# Patient Record
Sex: MALE | Race: BLACK OR AFRICAN AMERICAN | URBAN - METROPOLITAN AREA
[De-identification: names, ages, dates, MRNs, and addresses within clinical notes are randomized per-mention and may not be internally consistent; named-entity substitution may affect disease eponyms.]

---

## 2018-02-01 PROBLEM — I25.10 CAD (CORONARY ARTERY DISEASE): Status: ACTIVE | Noted: 2018-02-01

## 2018-02-01 PROBLEM — N18.30 STAGE 3 CHRONIC KIDNEY DISEASE (HCC): Status: ACTIVE | Noted: 2018-02-01

## 2018-02-01 PROBLEM — I10 HTN (HYPERTENSION): Status: ACTIVE | Noted: 2018-02-01

## 2018-02-01 PROBLEM — R55 SYNCOPE AND COLLAPSE: Status: ACTIVE | Noted: 2018-02-01

## 2022-06-01 ENCOUNTER — APPOINTMENT (OUTPATIENT)
Dept: GENERAL RADIOLOGY | Age: 82
DRG: 177 | End: 2022-06-01
Payer: MEDICARE

## 2022-06-01 ENCOUNTER — APPOINTMENT (OUTPATIENT)
Dept: CT IMAGING | Age: 82
DRG: 177 | End: 2022-06-01
Payer: MEDICARE

## 2022-06-01 ENCOUNTER — HOSPITAL ENCOUNTER (INPATIENT)
Age: 82
LOS: 7 days | Discharge: HOSPICE | DRG: 177 | End: 2022-06-08
Attending: EMERGENCY MEDICINE | Admitting: INTERNAL MEDICINE
Payer: MEDICARE

## 2022-06-01 DIAGNOSIS — J18.9 PNEUMONIA OF LOWER LOBE DUE TO INFECTIOUS ORGANISM, UNSPECIFIED LATERALITY: ICD-10-CM

## 2022-06-01 DIAGNOSIS — R41.82 ALTERED MENTAL STATUS, UNSPECIFIED ALTERED MENTAL STATUS TYPE: Primary | ICD-10-CM

## 2022-06-01 PROBLEM — J15.9 HOSPITAL-ACQUIRED BACTERIAL PNEUMONIA: Status: ACTIVE | Noted: 2022-06-01

## 2022-06-01 LAB
AMORPHOUS: ABNORMAL /HPF
ANION GAP SERPL CALCULATED.3IONS-SCNC: 7 MMOL/L (ref 3–16)
BACTERIA: ABNORMAL /HPF
BASE EXCESS VENOUS: 16.8 MMOL/L (ref -2–3)
BASE EXCESS VENOUS: 17 MMOL/L (ref -2–3)
BASOPHILS ABSOLUTE: 0 K/UL (ref 0–0.2)
BASOPHILS RELATIVE PERCENT: 1.2 %
BILIRUBIN URINE: ABNORMAL
BLOOD, URINE: ABNORMAL
BUN BLDV-MCNC: 20 MG/DL (ref 7–20)
CALCIUM SERPL-MCNC: 8.9 MG/DL (ref 8.3–10.6)
CARBOXYHEMOGLOBIN: 1.3 % (ref 0–1.5)
CARBOXYHEMOGLOBIN: 1.6 % (ref 0–1.5)
CHLORIDE BLD-SCNC: 102 MMOL/L (ref 99–110)
CLARITY: CLEAR
CO2: 35 MMOL/L (ref 21–32)
COLOR: YELLOW
CREAT SERPL-MCNC: 0.9 MG/DL (ref 0.8–1.3)
EKG ATRIAL RATE: 68 BPM
EKG DIAGNOSIS: NORMAL
EKG Q-T INTERVAL: 388 MS
EKG QRS DURATION: 70 MS
EKG QTC CALCULATION (BAZETT): 421 MS
EKG R AXIS: 82 DEGREES
EKG T AXIS: 84 DEGREES
EKG VENTRICULAR RATE: 71 BPM
EOSINOPHILS ABSOLUTE: 0 K/UL (ref 0–0.6)
EOSINOPHILS RELATIVE PERCENT: 0.3 %
GFR AFRICAN AMERICAN: >60
GFR NON-AFRICAN AMERICAN: >60
GLUCOSE BLD-MCNC: 123 MG/DL (ref 70–99)
GLUCOSE URINE: NEGATIVE MG/DL
HCO3 VENOUS: 43.8 MMOL/L (ref 24–28)
HCO3 VENOUS: 45.5 MMOL/L (ref 24–28)
HCT VFR BLD CALC: 32.9 % (ref 40.5–52.5)
HEMOGLOBIN, VEN, REDUCED: 29.2 %
HEMOGLOBIN, VEN, REDUCED: 63.2 %
HEMOGLOBIN: 10.6 G/DL (ref 13.5–17.5)
HYALINE CASTS: ABNORMAL /LPF (ref 0–2)
KETONES, URINE: NEGATIVE MG/DL
LACTIC ACID: 1.6 MMOL/L (ref 0.4–2)
LEUKOCYTE ESTERASE, URINE: NEGATIVE
LYMPHOCYTES ABSOLUTE: 0.6 K/UL (ref 1–5.1)
LYMPHOCYTES RELATIVE PERCENT: 17.7 %
MCH RBC QN AUTO: 33.7 PG (ref 26–34)
MCHC RBC AUTO-ENTMCNC: 32.2 G/DL (ref 31–36)
MCV RBC AUTO: 104.7 FL (ref 80–100)
METHEMOGLOBIN VENOUS: 0.2 % (ref 0–1.5)
METHEMOGLOBIN VENOUS: 0.4 % (ref 0–1.5)
MICROSCOPIC EXAMINATION: YES
MONOCYTES ABSOLUTE: 0.2 K/UL (ref 0–1.3)
MONOCYTES RELATIVE PERCENT: 6.4 %
MUCUS: ABNORMAL /LPF
NEUTROPHILS ABSOLUTE: 2.5 K/UL (ref 1.7–7.7)
NEUTROPHILS RELATIVE PERCENT: 74.4 %
NITRITE, URINE: NEGATIVE
O2 SAT, VEN: 36 %
O2 SAT, VEN: 70 %
PCO2, VEN: 68.2 MMHG (ref 41–51)
PCO2, VEN: 79.4 MMHG (ref 41–51)
PDW BLD-RTO: 16.6 % (ref 12.4–15.4)
PH UA: 6 (ref 5–8)
PH VENOUS: 7.37 (ref 7.35–7.45)
PH VENOUS: 7.42 (ref 7.35–7.45)
PLATELET # BLD: 163 K/UL (ref 135–450)
PMV BLD AUTO: 9.8 FL (ref 5–10.5)
PO2, VEN: 34.9 MMHG (ref 25–40)
PO2, VEN: <30 MMHG (ref 25–40)
POTASSIUM REFLEX MAGNESIUM: 5.7 MMOL/L (ref 3.5–5.1)
POTASSIUM SERPL-SCNC: 3.7 MMOL/L (ref 3.5–5.1)
PRO-BNP: 903 PG/ML (ref 0–449)
PROTEIN UA: 100 MG/DL
RBC # BLD: 3.14 M/UL (ref 4.2–5.9)
RBC UA: ABNORMAL /HPF (ref 0–4)
SODIUM BLD-SCNC: 144 MMOL/L (ref 136–145)
SPECIFIC GRAVITY UA: 1.02 (ref 1–1.03)
TCO2 CALC VENOUS: 46 MMOL/L
TCO2 CALC VENOUS: 48 MMOL/L
TROPONIN: 0.09 NG/ML
URINE TYPE: ABNORMAL
UROBILINOGEN, URINE: 2 E.U./DL
WBC # BLD: 3.4 K/UL (ref 4–11)
WBC UA: ABNORMAL /HPF (ref 0–5)

## 2022-06-01 PROCEDURE — 6360000002 HC RX W HCPCS: Performed by: EMERGENCY MEDICINE

## 2022-06-01 PROCEDURE — 83880 ASSAY OF NATRIURETIC PEPTIDE: CPT

## 2022-06-01 PROCEDURE — 82803 BLOOD GASES ANY COMBINATION: CPT

## 2022-06-01 PROCEDURE — 83605 ASSAY OF LACTIC ACID: CPT

## 2022-06-01 PROCEDURE — 84132 ASSAY OF SERUM POTASSIUM: CPT

## 2022-06-01 PROCEDURE — 70450 CT HEAD/BRAIN W/O DYE: CPT

## 2022-06-01 PROCEDURE — 93005 ELECTROCARDIOGRAM TRACING: CPT | Performed by: EMERGENCY MEDICINE

## 2022-06-01 PROCEDURE — 1200000000 HC SEMI PRIVATE

## 2022-06-01 PROCEDURE — 2060000000 HC ICU INTERMEDIATE R&B

## 2022-06-01 PROCEDURE — 71045 X-RAY EXAM CHEST 1 VIEW: CPT

## 2022-06-01 PROCEDURE — 87040 BLOOD CULTURE FOR BACTERIA: CPT

## 2022-06-01 PROCEDURE — 36415 COLL VENOUS BLD VENIPUNCTURE: CPT

## 2022-06-01 PROCEDURE — 84484 ASSAY OF TROPONIN QUANT: CPT

## 2022-06-01 PROCEDURE — 96365 THER/PROPH/DIAG IV INF INIT: CPT

## 2022-06-01 PROCEDURE — 85025 COMPLETE CBC W/AUTO DIFF WBC: CPT

## 2022-06-01 PROCEDURE — 81001 URINALYSIS AUTO W/SCOPE: CPT

## 2022-06-01 PROCEDURE — 80048 BASIC METABOLIC PNL TOTAL CA: CPT

## 2022-06-01 PROCEDURE — 96375 TX/PRO/DX INJ NEW DRUG ADDON: CPT

## 2022-06-01 PROCEDURE — 99285 EMERGENCY DEPT VISIT HI MDM: CPT

## 2022-06-01 PROCEDURE — 2580000003 HC RX 258: Performed by: EMERGENCY MEDICINE

## 2022-06-01 RX ORDER — MECOBALAMIN 5000 MCG
10 TABLET,DISINTEGRATING ORAL NIGHTLY
Status: DISCONTINUED | OUTPATIENT
Start: 2022-06-02 | End: 2022-06-08 | Stop reason: HOSPADM

## 2022-06-01 RX ORDER — SODIUM CHLORIDE, SODIUM LACTATE, POTASSIUM CHLORIDE, CALCIUM CHLORIDE 600; 310; 30; 20 MG/100ML; MG/100ML; MG/100ML; MG/100ML
1000 INJECTION, SOLUTION INTRAVENOUS ONCE
Status: COMPLETED | OUTPATIENT
Start: 2022-06-01 | End: 2022-06-01

## 2022-06-01 RX ADMIN — CEFEPIME HYDROCHLORIDE 1000 MG: 1 INJECTION, POWDER, FOR SOLUTION INTRAMUSCULAR; INTRAVENOUS at 20:59

## 2022-06-01 RX ADMIN — SODIUM CHLORIDE, POTASSIUM CHLORIDE, SODIUM LACTATE AND CALCIUM CHLORIDE 1000 ML: 600; 310; 30; 20 INJECTION, SOLUTION INTRAVENOUS at 20:13

## 2022-06-01 RX ADMIN — VANCOMYCIN HYDROCHLORIDE 1750 MG: 10 INJECTION, POWDER, LYOPHILIZED, FOR SOLUTION INTRAVENOUS at 22:26

## 2022-06-01 ASSESSMENT — PAIN - FUNCTIONAL ASSESSMENT: PAIN_FUNCTIONAL_ASSESSMENT: NONE - DENIES PAIN

## 2022-06-01 ASSESSMENT — ENCOUNTER SYMPTOMS
SHORTNESS OF BREATH: 1
COUGH: 1
CHEST TIGHTNESS: 0

## 2022-06-01 NOTE — ED PROVIDER NOTES
[id number]          ATTENDING PHYSICIAN NOTE       Date of evaluation: 6/1/2022    Chief Complaint     Altered Mental Status (Pt sent to ED for abnormal labs and AMS- which is mostly more lethargy ) and Other      History of Present Illness     Bianka Weems is a 79 y.o. male who presents emergency department from nursing with altered mental status. Patient is a nonhistorian I cannot get any history from this patient. I reviewed the nursing home records at the same etiology complex past medical history on multiple medications. He does have a creatinine of 1.4 based on recent lab work performed today. Otherwise looked okay the renal profile. It appears he gets most of his care at [facility] where he had a creatinine on May 16 of 1.05. Looks like he had a UTI back in May 9. This is all from review of care everywhere    Review of Systems     Review of Systems   Cannot obtain secondary patient's mental status    Past Medical, Surgical, Family, and Social History     He has a past medical history of Hyperlipidemia and Hypertension. He has no past surgical history on file. His family history is not on file. He reports that he has never smoked. He has never used smokeless tobacco. He reports that he does not drink alcohol and does not use drugs.     Medications     Previous Medications    ASPIRIN 325 MG TABLET    Take 325 mg by mouth daily    CALCIUM CARBONATE (OSCAL) 500 MG TABS TABLET    Take 500 mg by mouth daily    HYDROCHLOROTHIAZIDE (HYDRODIURIL) 25 MG TABLET    Take 25 mg by mouth daily    LISINOPRIL (PRINIVIL;ZESTRIL) 40 MG TABLET    Take 40 mg by mouth daily    METOPROLOL TARTRATE (LOPRESSOR) 25 MG TABLET    Take 25 mg by mouth daily     MULTIVITAMIN-IRON-MINERALS-FOLIC ACID (CENTRUM) CHEWABLE TABLET    Take 1 tablet by mouth daily    OMEGA-3 FATTY ACIDS (FISH OIL) 1000 MG CAPS    Take 1,000 mg by mouth daily     SIMVASTATIN (ZOCOR) 20 MG TABLET    Take 20 mg by mouth nightly    TAMSULOSIN (FLOMAX) 0.4 MG CAPSULE    Take 0.4 mg by mouth daily       Allergies     He is allergic to penicillins. Physical Exam     INITIAL VITALS: BP: 125/79, Temp: 97.7 °F (36.5 °C), Heart Rate: 68, Resp: 18, SpO2: 95 %   Physical Exam  Vitals and nursing note reviewed. Constitutional:       Appearance: He is ill-appearing (Chronic). He is not diaphoretic. Eyes:      General: No scleral icterus. Conjunctiva/sclera: Conjunctivae normal.   Cardiovascular:      Rate and Rhythm: Normal rate. Rhythm irregular. Pulmonary:      Effort: Pulmonary effort is normal.      Breath sounds: Rhonchi present. Abdominal:      Palpations: Abdomen is soft. Musculoskeletal:      Cervical back: No rigidity. Right lower leg: Edema present. Left lower leg: Edema present. Skin:     General: Skin is warm and dry. Neurological:      Mental Status: He is disoriented. Comments: And oriented x0. Talking incomprehensibly. Diagnostic Results     EKG   Appears to be a sinus rhythm with PAC and compensatory pause. Electronically being read as atrial fibrillation but I do not believe this is atrial fibrillation. Rate approximately 70. No ST elevation or depression. RADIOLOGY:  CT HEAD WO CONTRAST   Final Result      Cerebral atrophy. No acute intracranial findings. XR CHEST PORTABLE   Final Result      Ill-defined airspace density in the left base, consistent with early infiltrate or atelectasis. No other acute cardiopulmonary findings.              LABS:   Results for orders placed or performed during the hospital encounter of 06/01/22   CBC with Auto Differential   Result Value Ref Range    WBC 3.4 (L) 4.0 - 11.0 K/uL    RBC 3.14 (L) 4.20 - 5.90 M/uL    Hemoglobin 10.6 (L) 13.5 - 17.5 g/dL    Hematocrit 32.9 (L) 40.5 - 52.5 %    .7 (H) 80.0 - 100.0 fL    MCH 33.7 26.0 - 34.0 pg    MCHC 32.2 31.0 - 36.0 g/dL    RDW 16.6 (H) 12.4 - 15.4 %    Platelets 163 135 - 450 K/uL    MPV 9.8 5.0 - 10.5 fL    Neutrophils % 74.4 %    Lymphocytes % 17.7 %    Monocytes % 6.4 %    Eosinophils % 0.3 %    Basophils % 1.2 %    Neutrophils Absolute 2.5 1.7 - 7.7 K/uL    Lymphocytes Absolute 0.6 (L) 1.0 - 5.1 K/uL    Monocytes Absolute 0.2 0.0 - 1.3 K/uL    Eosinophils Absolute 0.0 0.0 - 0.6 K/uL    Basophils Absolute 0.0 0.0 - 0.2 K/uL   Basic Metabolic Panel w/ Reflex to MG   Result Value Ref Range    Sodium 144 136 - 145 mmol/L    Potassium reflex Magnesium 5.7 (H) 3.5 - 5.1 mmol/L    Chloride 102 99 - 110 mmol/L    CO2 35 (H) 21 - 32 mmol/L    Anion Gap 7 3 - 16    Glucose 123 (H) 70 - 99 mg/dL    BUN 20 7 - 20 mg/dL    CREATININE 0.9 0.8 - 1.3 mg/dL    GFR Non-African American >60 >60    GFR African American >60 >60    Calcium 8.9 8.3 - 10.6 mg/dL   Troponin   Result Value Ref Range    Troponin 0.09 (H) <0.01 ng/mL   Brain Natriuretic Peptide   Result Value Ref Range    Pro- (H) 0 - 449 pg/mL   Urinalysis with Microscopic   Result Value Ref Range    Color, UA Yellow Straw/Yellow    Clarity, UA Clear Clear    Glucose, Ur Negative Negative mg/dL    Bilirubin Urine MODERATE (A) Negative    Ketones, Urine Negative Negative mg/dL    Specific Gravity, UA 1.025 1.005 - 1.030    Blood, Urine TRACE-INTACT (A) Negative    pH, UA 6.0 5.0 - 8.0    Protein,  (A) Negative mg/dL    Urobilinogen, Urine 2.0 (A) <2.0 E.U./dL    Nitrite, Urine Negative Negative    Leukocyte Esterase, Urine Negative Negative    Microscopic Examination YES     Urine Type NotGiven    Blood gas, venous (Lab)   Result Value Ref Range    pH, Trevor 7.366 7.350 - 7.450    pCO2, Trevor 79.4 (H) 41.0 - 51.0 mmHg    pO2, Trevor <30.0 25.0 - 40.0 mmHg    HCO3, Venous 45.5 (H) 24.0 - 28.0 mmol/L    Base Excess, Trevor 17.0 (H) -2.0 - 3.0 mmol/L    O2 Sat, Trevor 36 Not established %    Carboxyhemoglobin 1.3 0.0 - 1.5 %    MetHgb, Trevor 0.4 0.0 - 1.5 %    TC02 (Calc), Trevor 48 mmol/L    Hemoglobin, Trevor, Reduced 63.20 %   Lactic Acid Result Value Ref Range    Lactic Acid 1.6 0.4 - 2.0 mmol/L   EKG 12 Lead   Result Value Ref Range    Ventricular Rate 71 BPM    Atrial Rate 68 BPM    QRS Duration 70 ms    Q-T Interval 388 ms    QTc Calculation (Bazett) 421 ms    R Axis 82 degrees    T Axis 84 degrees    Diagnosis       EKG performed in ER and to be interpreted by ER physician. Confirmed by MD, ER ([id number]),  Rubenskim Funmilayo ([phone]) on 6/1/2022 7:27:17 PM         RECENT VITALS:  BP: 125/79,Temp: 97.7 °F (36.5 °C), Heart Rate: 68, Resp: 18, SpO2: 95 %     Procedures         ED Course     Nursing Notes, Past Medical Hx, Past Surgical Hx, Social Hx,Allergies, and Family Hx were reviewed. patient was given the following medications:  Orders Placed This Encounter   Medications    lactated ringers infusion 1,000 mL    cefepime (MAXIPIME) 1000 mg IVPB minibag     Order Specific Question:   Antimicrobial Indications     Answer:   Pneumonia (HAP)    vancomycin (VANCOCIN) 1,750 mg in dextrose 5 % 500 mL IVPB     Order Specific Question:   Antimicrobial Indications     Answer:   Pneumonia (CAP)       CONSULTS:  PHARMACY TO [address] [place], [place] [address]IST    MEDICAL DECISIONMAKING / ASSESSMENT / Zayrakar Jimenez is a 79 y.o. male presented with the above. Chest x-ray was suspicious for pneumonia started on cefepime and vancomycin after 2 blood cultures here. His renal profile is delayed by labs here however he had one earlier today with a creatinine 1.4 and a CO2 of 41. His venous blood gas pH 7.36 with a PCO2 74 so sounds like he would have a chronic CO2 retention given his elevated CO2 on his renal.  He was given fluids however he does have significant peripheral edema and the BMP is also delayed with a chest x-ray did not report significant CHF. His sats ranged from 91 to 97% he is placed on 2 L and remained 94-97. His BNP is came back at 907.   He did get fluids here in the ED because of the hypoxemia with pneumonia however this will need to be watched closely and his troponin was 0.09 which is then higher than baseline. His EKG showed the sinus with PACs. Family member in room let me know that he does have a history of pulmonary emboli however patient is anticoagulated on Eliquis and has not missed any doses while in the nursing facility. Critical Care:  Due to the immediate potential for life-threatening deterioration due to altered mental status and pneumonia, I spent 40 minutes providing critical care. This time excludes time spent performing procedures but includes time spent on direct patient care, history retrieval, review of the chart, and discussions with patient, family, and consultant(s). Clinical Impression     1. Altered mental status, unspecified altered mental status type    2. Pneumonia of lower lobe due to infectious organism, unspecified laterality        Disposition     PATIENT REFERRED TO:  No follow-up provider specified.     DISCHARGE MEDICATIONS:  New Prescriptions    No medications on file       Elly Reynolds MD  06/01/22 [id number]

## 2022-06-02 LAB
BASOPHILS ABSOLUTE: 0 K/UL (ref 0–0.2)
BASOPHILS RELATIVE PERCENT: 0.1 %
EOSINOPHILS ABSOLUTE: 0 K/UL (ref 0–0.6)
EOSINOPHILS RELATIVE PERCENT: 0.3 %
FOLATE: >20 NG/ML (ref 4.78–24.2)
HCT VFR BLD CALC: 33.2 % (ref 40.5–52.5)
HEMOGLOBIN: 10.5 G/DL (ref 13.5–17.5)
LYMPHOCYTES ABSOLUTE: 0.5 K/UL (ref 1–5.1)
LYMPHOCYTES RELATIVE PERCENT: 13.8 %
MCH RBC QN AUTO: 33 PG (ref 26–34)
MCHC RBC AUTO-ENTMCNC: 31.5 G/DL (ref 31–36)
MCV RBC AUTO: 104.8 FL (ref 80–100)
MONOCYTES ABSOLUTE: 0.2 K/UL (ref 0–1.3)
MONOCYTES RELATIVE PERCENT: 6 %
NEUTROPHILS ABSOLUTE: 2.6 K/UL (ref 1.7–7.7)
NEUTROPHILS RELATIVE PERCENT: 79.8 %
PDW BLD-RTO: 15.7 % (ref 12.4–15.4)
PLATELET # BLD: 131 K/UL (ref 135–450)
PMV BLD AUTO: 9.2 FL (ref 5–10.5)
PROCALCITONIN: 0.24 NG/ML (ref 0–0.15)
RBC # BLD: 3.17 M/UL (ref 4.2–5.9)
SEDIMENTATION RATE, ERYTHROCYTE: 31 MM/HR (ref 0–20)
TROPONIN: 0.08 NG/ML
TROPONIN: 0.1 NG/ML
VITAMIN B-12: >2000 PG/ML (ref 211–911)
WBC # BLD: 3.3 K/UL (ref 4–11)

## 2022-06-02 PROCEDURE — 1200000000 HC SEMI PRIVATE

## 2022-06-02 PROCEDURE — 97162 PT EVAL MOD COMPLEX 30 MIN: CPT

## 2022-06-02 PROCEDURE — 6360000002 HC RX W HCPCS

## 2022-06-02 PROCEDURE — 6360000002 HC RX W HCPCS: Performed by: INTERNAL MEDICINE

## 2022-06-02 PROCEDURE — 82607 VITAMIN B-12: CPT

## 2022-06-02 PROCEDURE — 82746 ASSAY OF FOLIC ACID SERUM: CPT

## 2022-06-02 PROCEDURE — 36415 COLL VENOUS BLD VENIPUNCTURE: CPT

## 2022-06-02 PROCEDURE — 2060000000 HC ICU INTERMEDIATE R&B

## 2022-06-02 PROCEDURE — 99221 1ST HOSP IP/OBS SF/LOW 40: CPT | Performed by: NURSE PRACTITIONER

## 2022-06-02 PROCEDURE — 85025 COMPLETE CBC W/AUTO DIFF WBC: CPT

## 2022-06-02 PROCEDURE — 97167 OT EVAL HIGH COMPLEX 60 MIN: CPT

## 2022-06-02 PROCEDURE — 97530 THERAPEUTIC ACTIVITIES: CPT

## 2022-06-02 PROCEDURE — 2580000003 HC RX 258

## 2022-06-02 PROCEDURE — 84145 PROCALCITONIN (PCT): CPT

## 2022-06-02 PROCEDURE — 6370000000 HC RX 637 (ALT 250 FOR IP)

## 2022-06-02 PROCEDURE — 92610 EVALUATE SWALLOWING FUNCTION: CPT

## 2022-06-02 PROCEDURE — 84484 ASSAY OF TROPONIN QUANT: CPT

## 2022-06-02 PROCEDURE — 85652 RBC SED RATE AUTOMATED: CPT

## 2022-06-02 RX ORDER — ACETAMINOPHEN 325 MG/1
650 TABLET ORAL EVERY 6 HOURS PRN
Status: DISCONTINUED | OUTPATIENT
Start: 2022-06-02 | End: 2022-06-08 | Stop reason: HOSPADM

## 2022-06-02 RX ORDER — SODIUM CHLORIDE 9 MG/ML
INJECTION, SOLUTION INTRAVENOUS CONTINUOUS
Status: ACTIVE | OUTPATIENT
Start: 2022-06-02 | End: 2022-06-03

## 2022-06-02 RX ORDER — ASPIRIN 325 MG
325 TABLET ORAL DAILY
Status: DISCONTINUED | OUTPATIENT
Start: 2022-06-02 | End: 2022-06-08 | Stop reason: HOSPADM

## 2022-06-02 RX ORDER — TAMSULOSIN HYDROCHLORIDE 0.4 MG/1
0.4 CAPSULE ORAL DAILY
Status: DISCONTINUED | OUTPATIENT
Start: 2022-06-02 | End: 2022-06-08 | Stop reason: HOSPADM

## 2022-06-02 RX ORDER — METOPROLOL SUCCINATE 25 MG/1
25 TABLET, EXTENDED RELEASE ORAL DAILY
Status: ON HOLD | COMMUNITY
End: 2022-06-08 | Stop reason: HOSPADM

## 2022-06-02 RX ORDER — SODIUM CHLORIDE 0.9 % (FLUSH) 0.9 %
5-40 SYRINGE (ML) INJECTION EVERY 12 HOURS SCHEDULED
Status: DISCONTINUED | OUTPATIENT
Start: 2022-06-02 | End: 2022-06-08 | Stop reason: HOSPADM

## 2022-06-02 RX ORDER — HYDRALAZINE HYDROCHLORIDE 20 MG/ML
5 INJECTION INTRAMUSCULAR; INTRAVENOUS EVERY 6 HOURS PRN
Status: DISCONTINUED | OUTPATIENT
Start: 2022-06-02 | End: 2022-06-08 | Stop reason: HOSPADM

## 2022-06-02 RX ORDER — ONDANSETRON 4 MG/1
4 TABLET, ORALLY DISINTEGRATING ORAL EVERY 8 HOURS PRN
Status: DISCONTINUED | OUTPATIENT
Start: 2022-06-02 | End: 2022-06-08 | Stop reason: HOSPADM

## 2022-06-02 RX ORDER — ENOXAPARIN SODIUM 100 MG/ML
40 INJECTION SUBCUTANEOUS DAILY
Status: DISCONTINUED | OUTPATIENT
Start: 2022-06-02 | End: 2022-06-02

## 2022-06-02 RX ORDER — OLANZAPINE 5 MG/1
5 TABLET, ORALLY DISINTEGRATING ORAL NIGHTLY
Status: DISCONTINUED | OUTPATIENT
Start: 2022-06-02 | End: 2022-06-02

## 2022-06-02 RX ORDER — ONDANSETRON 2 MG/ML
4 INJECTION INTRAMUSCULAR; INTRAVENOUS EVERY 6 HOURS PRN
Status: DISCONTINUED | OUTPATIENT
Start: 2022-06-02 | End: 2022-06-08 | Stop reason: HOSPADM

## 2022-06-02 RX ORDER — HYDROCHLOROTHIAZIDE 25 MG/1
25 TABLET ORAL DAILY
Status: DISCONTINUED | OUTPATIENT
Start: 2022-06-02 | End: 2022-06-02

## 2022-06-02 RX ORDER — THIAMINE HYDROCHLORIDE 100 MG/ML
100 INJECTION, SOLUTION INTRAMUSCULAR; INTRAVENOUS DAILY
Status: DISCONTINUED | OUTPATIENT
Start: 2022-06-02 | End: 2022-06-08 | Stop reason: HOSPADM

## 2022-06-02 RX ORDER — ENOXAPARIN SODIUM 100 MG/ML
1 INJECTION SUBCUTANEOUS 2 TIMES DAILY
Status: DISCONTINUED | OUTPATIENT
Start: 2022-06-02 | End: 2022-06-08 | Stop reason: HOSPADM

## 2022-06-02 RX ORDER — LISINOPRIL 40 MG/1
40 TABLET ORAL DAILY
Status: DISCONTINUED | OUTPATIENT
Start: 2022-06-02 | End: 2022-06-08 | Stop reason: HOSPADM

## 2022-06-02 RX ORDER — SODIUM CHLORIDE 0.9 % (FLUSH) 0.9 %
5-40 SYRINGE (ML) INJECTION PRN
Status: DISCONTINUED | OUTPATIENT
Start: 2022-06-02 | End: 2022-06-08 | Stop reason: HOSPADM

## 2022-06-02 RX ORDER — METOPROLOL SUCCINATE 25 MG/1
12.5 TABLET, EXTENDED RELEASE ORAL DAILY
Status: DISCONTINUED | OUTPATIENT
Start: 2022-06-02 | End: 2022-06-08 | Stop reason: HOSPADM

## 2022-06-02 RX ORDER — POLYETHYLENE GLYCOL 3350 17 G/17G
17 POWDER, FOR SOLUTION ORAL DAILY PRN
Status: DISCONTINUED | OUTPATIENT
Start: 2022-06-02 | End: 2022-06-08 | Stop reason: HOSPADM

## 2022-06-02 RX ORDER — ACETAMINOPHEN 650 MG/1
650 SUPPOSITORY RECTAL EVERY 6 HOURS PRN
Status: DISCONTINUED | OUTPATIENT
Start: 2022-06-02 | End: 2022-06-08 | Stop reason: HOSPADM

## 2022-06-02 RX ORDER — ATORVASTATIN CALCIUM 20 MG/1
10 TABLET, FILM COATED ORAL DAILY
Status: DISCONTINUED | OUTPATIENT
Start: 2022-06-02 | End: 2022-06-08 | Stop reason: HOSPADM

## 2022-06-02 RX ORDER — SODIUM CHLORIDE 9 MG/ML
INJECTION, SOLUTION INTRAVENOUS PRN
Status: DISCONTINUED | OUTPATIENT
Start: 2022-06-02 | End: 2022-06-08 | Stop reason: HOSPADM

## 2022-06-02 RX ADMIN — CEFEPIME HYDROCHLORIDE 2000 MG: 2 INJECTION, POWDER, FOR SOLUTION INTRAVENOUS at 16:09

## 2022-06-02 RX ADMIN — VANCOMYCIN HYDROCHLORIDE 750 MG: 10 INJECTION, POWDER, LYOPHILIZED, FOR SOLUTION INTRAVENOUS at 11:38

## 2022-06-02 RX ADMIN — CEFEPIME HYDROCHLORIDE 2000 MG: 2 INJECTION, POWDER, FOR SOLUTION INTRAVENOUS at 08:51

## 2022-06-02 RX ADMIN — VANCOMYCIN HYDROCHLORIDE 750 MG: 10 INJECTION, POWDER, LYOPHILIZED, FOR SOLUTION INTRAVENOUS at 23:27

## 2022-06-02 RX ADMIN — CEFEPIME HYDROCHLORIDE 2000 MG: 2 INJECTION, POWDER, FOR SOLUTION INTRAVENOUS at 23:03

## 2022-06-02 RX ADMIN — ENOXAPARIN SODIUM 90 MG: 100 INJECTION SUBCUTANEOUS at 22:55

## 2022-06-02 RX ADMIN — SODIUM CHLORIDE, PRESERVATIVE FREE 10 ML: 5 INJECTION INTRAVENOUS at 22:55

## 2022-06-02 RX ADMIN — THIAMINE HYDROCHLORIDE 100 MG: 100 INJECTION, SOLUTION INTRAMUSCULAR; INTRAVENOUS at 23:19

## 2022-06-02 RX ADMIN — SODIUM CHLORIDE, PRESERVATIVE FREE 10 ML: 5 INJECTION INTRAVENOUS at 09:00

## 2022-06-02 RX ADMIN — SODIUM CHLORIDE: 9 INJECTION, SOLUTION INTRAVENOUS at 08:47

## 2022-06-02 RX ADMIN — OLANZAPINE 5 MG: 5 TABLET, ORALLY DISINTEGRATING ORAL at 00:41

## 2022-06-02 ASSESSMENT — PAIN SCALES - PAIN ASSESSMENT IN ADVANCED DEMENTIA (PAINAD)
BODYLANGUAGE: 0
BREATHING: 1
TOTALSCORE: 1
FACIALEXPRESSION: 0
NEGVOCALIZATION: 0
CONSOLABILITY: 0

## 2022-06-02 ASSESSMENT — ENCOUNTER SYMPTOMS
SHORTNESS OF BREATH: 1
GASTROINTESTINAL NEGATIVE: 1
TROUBLE SWALLOWING: 1

## 2022-06-02 ASSESSMENT — PAIN SCALES - WONG BAKER: WONGBAKER_NUMERICALRESPONSE: 0

## 2022-06-02 NOTE — PROGRESS NOTES
Clinical Pharmacy Progress Note    Vancomycin  - Management by Pharmacy    Consult Date(s): 06/02/2022  Consulting Provider(s): Maurice    Assessment / Plan  Pneumonia (CAP) - Vancomycin   Concurrent Antimicrobials: Cefepime IV    Day of Vanc Therapy: 2   Current Dosing Method: Bayesian-Guided AUC Dosing   Therapeutic Goal: 400-600 mg/L*hr   Current Dose / Frequency: 750 mg every 12 hours   Plan / Rationale: Patient received 1750 mg x1 dose. A dose of 750 mg Q 12 hours will  expect AUC 465mg/L x hr with trough of 15.7 mg/L   Will continue to monitor clinical condition and make adjustments to regimen as appropriate. Thank you for consulting Pharmacy! Ludivina Sample, [facility], PharmD, [address] Vic[address]  6/2/2022 6:44 AM            Subjective/Objective: Mr. Bettye Salvador is a 79 y.o. male with a PMHx significant for  HLD, HTN , CAD s/p cardiac cath 2012 with stents, GERD, PVD, admitted for fatigue, SOB possible PNA. Pharmacy has been consulted to dose vancomycin. Ht Readings from Last 1 Encounters:   06/01/22 6' 5\" (1.956 m)     Wt Readings from Last 1 Encounters:   06/01/22 193 lb (87.5 kg)       Current & Prior Antimicrobial Regimen(s):   Cefepime     Level(s) / Doses:    Date Time Dose Level / Type of Level Interpretation                 Note: Serum levels collected for AUC-based dosing may be high if collected in close proximity to the dose administered. This is not necessarily indicative of toxicity. Cultures & Sensitivities:    Date Site Micro Susceptibility / Result                 Labs / Ancillary Data:    Estimated Creatinine Clearance: 77 mL/min (based on SCr of 0.9 mg/dL). Recent Labs     06/01/22  1946   CREATININE 0.9   BUN 20   WBC 3.4*       Additional Lab Values / Findings of Note:    Procalcitonin: No results for input(s): PROCAL in the last 72 hours.

## 2022-06-02 NOTE — PROGRESS NOTES
Cefepime 2 g Q 12 hours ordered for patient. This medication is renally eliminated. Will change to cefepime 2 g Q 8 hours per renal dose adjustment policy. Estimated Creatinine Clearance: 77 mL/min (based on SCr of 0.9 mg/dL). Pharmacy will continue to monitor renal function and adjust dose as necessary. Please call with any questions. Thanks!

## 2022-06-02 NOTE — CONSULTS
[facility]  Palliative Medicine Consultation Note      Date Of Admission:6/1/2022  Date of consult: 06/02/22  Seen by [facility] in the past:  No    Recommendations:        Pt is oriented to himself only and does not seem to have any insight into his medical problems. Called his (step)daughter Soledad Beck, introduced palliative care and had a voluntary discussion about advance care planning. Soledad Beck says she is pt's HCPOA and will bring the document in later this evening. She says pt has expressed multiple times in the past that he wants to be DNR. She reports a decline over the past year and is concerned about dysphagia. We agreed to talk again tomorrow after his MBS. 1. Goals of Care/Advanced Care planning/Code status: Full Code, discussed with Soledad Beck today. Will f/u with Soledad Beck tomorrow after she brings in [place] and after MBS. 2. Pain: pt denies pain. 3. SOB: denies sob and is breathing comfortably on 4 L oxygen. He's being treated for possible pneumonia vs possible aspiration event. 4. Dysphagia: daughter reports signs of dysphagia starting yesterday. He's NPO per SLP recommendations and plan is for [facility] tomorrow. Will f/u with family after the test. We briefly discussed PEG which Soledad Beck doesn't know that pt would want. 5. Unintentional weight loss: family reports 70-80# weight loss in the past 5 years, with significant weight loss in the past year.  Current BMI is 22.  6. Disposition: TBD    Reason for Consult:         [x]  Goals of Care  [x]  Code Status Discussion/Advanced Care Planning   []  Psychosocial/Family Support  []  Symptom Management  []  Other (Specify)    Requesting Physician: Dr. Boni Anuglo:  Fatigue, altered mental status, sob    History Obtained From:  family member - daughter Soledad Beck, electronic medical record    History of Present Illness:         Jeanine Colin is a 79 y.o. male with PMH of HLD, HTN , CAD s/p cardiac cath 2012 with stents, prostate cancer, GERD, KRISTIND who presented with fatigue, shortness of breath, and altered mental status. Patient was recently discharged for PE, UTI and MADDI with hypernatremia and was treated with antibiotics and discharged to SNF. He's now admitted with possible pneumonia with hx of COPD and recent PE. Subjective:         Past Medical History:        Diagnosis Date    Hyperlipidemia     Hypertension        Past Surgical History:    History reviewed. No pertinent surgical history. Current Medications:    Not in a hospital admission. Allergies:  Penicillins    Social History:    · TOBACCO: reports that he quit smoking about 4 weeks ago. His smoking use included cigarettes. He has a 70.00 pack-year smoking history. He has never used smokeless tobacco.  · ETOH:   reports no history of alcohol use. · Patient currently lives alone, most recently at a SNF    Review of Systems - obtained from pt and family  Review of Systems   Constitutional: Positive for fatigue and unexpected weight change. HENT: Positive for trouble swallowing. Respiratory: Positive for shortness of breath. Cardiovascular: Negative. Gastrointestinal: Negative. Genitourinary: Negative. Musculoskeletal: Negative. Neurological: Positive for weakness. Psychiatric/Behavioral: Positive for confusion.   :     Objective:        Physical Exam  Constitutional:       General: He is not in acute distress. HENT:      Head: Normocephalic and atraumatic. Cardiovascular:      Rate and Rhythm: Normal rate. Rhythm irregular. Pulmonary:      Effort: Pulmonary effort is normal.      Breath sounds: Normal breath sounds. Abdominal:      General: Bowel sounds are normal.      Palpations: Abdomen is soft. Musculoskeletal:      Right lower leg: Edema present. Left lower leg: Edema present. Comments: Dressings BLE   Neurological:      Mental Status: He is disoriented.       Comments: Oriented to self only          Palliative Performance Scale:  [] 60% Ambulation reduced; Significant disease; Can't do hobbies/housework; intake normal or reduced; occasional assist; LOC full/confusion  [] 50% Mainly sit/lie; Extensive disease; Can't do any work; Considerable assist; intake normal  Or reduced; LOC full/confusion  [x] 40% Mainly in bed; Extensive disease; Mainly assist; intake normal or reduced; occasional assist; LOC full/confusion  [] 30% Bed Bound; Extensive disease; Total care; intake reduced; LOC full/confusion  [] 20% Bed Bound; Extensive disease; Total care; intake minimal; Drowsy/coma  [] 10% Bed Bound; Extensive disease; Total care; Mouth care only; Drowsy/coma  [] 0% Death      Vitals:    /85   Pulse 79   Temp 97.6 °F (36.4 °C) (Oral)   Resp 22   Ht 6' 5\" (1.956 m)   Wt 193 lb (87.5 kg)   SpO2 92%   BMI 22.89 kg/m²     Labs:    BMP:   Recent Labs     06/01/22 1946 06/01/22  2256     --    K 5.7* 3.7     --    CO2 35*  --    BUN 20  --    CREATININE 0.9  --    GLUCOSE 123*  --      CBC:   Recent Labs     06/01/22 1946 06/02/22  0942   WBC 3.4* 3.3*   HGB 10.6* 10.5*   HCT 32.9* 33.2*    131*       LFT's: No results for input(s): AST, ALT, ALB, BILITOT, ALKPHOS in the last 72 hours. Troponin:   Recent Labs     06/01/22 1946 06/02/22  0942   TROPONINI 0.09* 0.10*     BNP: No results for input(s): BNP in the last 72 hours. ABGs: No results for input(s): PHART, TCY0LRT, PO2ART in the last 72 hours. INR: No results for input(s): INR in the last 72 hours. U/A:  Recent Labs     06/01/22 2058   COLORU Yellow   PHUR 6.0   WBCUA 3-5   RBCUA 0-2   MUCUS 1+*   BACTERIA 2+*   CLARITYU Clear   SPECGRAV 1.025   LEUKOCYTESUR Negative   UROBILINOGEN 2.0*   BILIRUBINUR MODERATE*   BLOODU TRACE-INTACT*   GLUCOSEU Negative   AMORPHOUS 1+       CT HEAD WO CONTRAST   Final Result      Cerebral atrophy. No acute intracranial findings.             XR CHEST PORTABLE   Final Result      Ill-defined airspace density in the left base,

## 2022-06-02 NOTE — CONSULTS
Clinical Pharmacy Consult Note       Pharmacy consulted by Dr. Haris Rivera in ED to order first dose of vancomycin. Indication: CAP with MDR risk factors    Patient Data:     No results for input(s): NA, K, CL, CO2, PHOS, BUN, CREATININE, CA in the last 72 hours. CrCl cannot be calculated (Patient's most recent lab result is older than the maximum 30 days allowed. ). Recent Labs     06/01/22  1946   WBC 3.4*   HGB 10.6*   HCT 32.9*   .7*        Height:  6' 5\" (195.6 cm)  Weight: 193 lb (87.5 kg)    Plan: Will order vancomycin 1750 mg x 1 dose, to be administered in ED. Please note this consult covers ED vancomycin dose only. If admitting provider would like further vancomycin dose management by pharmacy, please place additional consult order. Thank you for the consult. Please call with any questions.   Marisabel HernandezD  Main Pharmacy: [id number]  6/1/2022 9:34 PM

## 2022-06-02 NOTE — PROGRESS NOTES
Occupational Therapy  Facility/Department: [facility]  Occupational Therapy Initial Assessment and Treatment      Name: Diana Stone  : 1940  MRN: [id number]  Date of Service: 2022    Discharge Recommendations:  Diana Stone scored a 9/24 on the AM-PAC ADL Inpatient form. Current research shows that an AM-PAC score of 17 or less is typically not associated with a discharge to the patient's home setting. Based on the patient's AM-PAC score and their current ADL deficits, it is recommended that the patient have 3-5 sessions per week of Occupational Therapy at d/c to increase the patient's independence. Please see assessment section for further patient specific details. If patient discharges prior to next session this note will serve as a discharge summary. Please see below for the latest assessment towards goals. OT Equipment Recommendations  Equipment Needed:  (defer recommendations to discharge facility)       Patient Diagnosis(es): The primary encounter diagnosis was Altered mental status, unspecified altered mental status type. A diagnosis of Pneumonia of lower lobe due to infectious organism, unspecified laterality was also pertinent to this visit. Past Medical History:  has a past medical history of Hyperlipidemia and Hypertension. Past Surgical History:  has no past surgical history on file. Treatment Diagnosis: impaired ADLs/transfers and decreased functional activity tolerance      Assessment   Performance deficits / Impairments: Decreased functional mobility ; Decreased ADL status; Decreased cognition;Decreased endurance;Decreased strength;Decreased balance;Decreased posture  Assessment: Presenting from NH (per chart review). Pt is a poor historian and unable to provide information regarding facility or PLOF. Pt presenting w/ generalized weakness and requiring assist of 1/2 for bed mobility.  Sitting balance mostly Max A (w/ flexed posture and L lobo lean). Sit-stand w/ Mod/Max A of 2 - L knee buckling and unable to obtain full standing posture. Generalized fatigue on min exertion. Will continue to follow during admission as appropriate. Treatment Diagnosis: impaired ADLs/transfers and decreased functional activity tolerance  Prognosis: Fair;Poor  Decision Making: High Complexity  REQUIRES OT FOLLOW-UP: Yes  Activity Tolerance  Activity Tolerance: Patient limited by fatigue        Plan   Plan  Times per Week: 2-5  Times per Day: Daily  Current Treatment Recommendations: Strengthening,ROM,Balance training,Functional mobility training,Endurance training,Safety education & training,Cognitive reorientation,Self-Care / ADL     Restrictions  Position Activity Restriction  Other position/activity restrictions: up with assist    Subjective   General  Chart Reviewed: Yes  Additional Pertinent Hx: 79 y.o. M presents w/ AMS. Hospital Course: CT Head: (-). PMH: HLD, HTN , CAD s/p cardiac cath 2012 with stents, GERD, PVD  Family / Caregiver Present: No  Referring Practitioner: Levar Dewitt MD  Diagnosis: Hospital Acquired Bacterial Pneumonia    Subjective  Subjective: In bed on arrival. C/o feeling \"cold. \" Oriented to self, location; disoriented to situation. Soft spoken and difficult to understand. Social/Functional History  Social/Functional History  Type of Home: Facility (unknown if LTC or SNF?)  Home Equipment: Cane,Walker, 4 wheeled  Has the patient had two or more falls in the past year or any fall with injury in the past year?: Yes  Receives Help From:  (reports dtr visits)  ADL Assistance: [address]: Independent (reports he does his own laundry and meals)  Ambulation Assistance: Independent (using 4 wh walker or cane)  Transfer Assistance: Independent  Additional Comments: pt is poor historian. pt reports he is from home and IND prior to admission doing all household tasks and ambulating with 4WW or cane.  although per chart review pt from nursing home (unknown length of stay). unknown if pt is currently ambulatory or extent of what the facility is currently assisting pt with       Objective       O2 Device: Nasal cannula - 4L     Vision Exceptions: Wears glasses at all times  Hearing: Exceptions to [facility]  Hearing Exceptions: Hard of hearing/hearing concerns         Observation/Palpation  Observation: drooling     Safety Devices  Type of Devices: Nurse notified;Sitter present; Left in bed; All brendan prominences offloaded;Bed alarm in place;Call light within reach     Balance  Sitting: Impaired (x 7-8 minutes - mostly Max A w/ LOB to L and post; intermittent Min A; flexed trunk; unable to correct sitting balance despite physical/verbal/visual cues)  Standing: Impaired (Max A of 2 (unable to release L hand from bedrail to walker, L knee flexed))           ADL  Feeding: NPO  Feeding Skilled Clinical Factors: NOTE: observed to have drooling from mouth  Grooming: Moderate assistance (wiping drool from mouth)  LE Dressing: Dependent/Total (don socks)  Toileting: Dependent/Total        Activity Tolerance  Activity Tolerance: Patient tolerated evaluation without incident;Patient limited by endurance; Patient limited by fatigue  Activity Tolerance Comments: pt with very limited tolerance, fatiguing with EOB sitting and only able to maintain stance for ~10 seconds before needing to sit and return to supine     Bed mobility  Rolling to Left: Maximum assistance  Rolling to Right: Maximum assistance  Supine to Sit: Moderate assistance (HOB elevated, max VC for sequencing)  Sit to Supine: Maximum assistance;2 Person assistance (max A of 2)  Scooting: Maximal assistance     Transfers  Sit to stand: Dependent/Total (Mod/Max A of 2 (from bed to walker, knees blocked))  Stand to sit: Dependent/Total (Max A of 2)        Cognition  Overall Cognitive Status: Exceptions  Arousal/Alertness: Delayed responses to stimuli  Following Commands:  Follows one step commands with repetition  Attention Span: Difficulty attending to directions  Memory: Decreased recall of biographical Information;Decreased recall of recent events;Decreased short term memory  Safety Judgement: Decreased awareness of need for assistance;Decreased awareness of need for safety  Problem Solving: Decreased awareness of errors  Insights: Decreased awareness of deficits  Initiation: Requires cues for all                     Education Given To: Patient  Education Provided: Role of Therapy;Plan of Care  Education Method: Verbal  Barriers to Learning: Cognition  Education Outcome: Continued education needed; Unable to demonstrate understanding              Hand Dominance  Hand Dominance: Right         Pt seen by OT for eval and treat.  Treatment included: bed mobility, unsupported sitting, ADL, transfer                                                          AM-PAC Score        AM-[facility] Inpatient Daily Activity Raw Score: 9 (06/02/22 1002)  AM-PAC Inpatient ADL T-Scale Score : 25.33 (06/02/22 1002)  ADL Inpatient CMS 0-100% Score: 79.59 (06/02/22 1002)  ADL Inpatient CMS G-Code Modifier : CL (06/02/22 1002)    Goals  Short Term Goals  Time Frame for Short term goals: Discharge  Short Term Goal 1: unsupported sitting at EOB w/ Min A for sitting balance  Short Term Goal 2: simple grooming tasks w/ setup, Min A  Short Term Goal 3: improve sit-stand transfer to Min A of 2  Patient Goals   Patient goals : no goal stated       Therapy Time   Individual Concurrent Group Co-treatment   Time In 0900         Time Out 0933         Minutes 33           Timed Code Treatment Minutes:   17    Total Treatment Minutes:  [address] [facility] OTR/L #[id number]

## 2022-06-02 NOTE — PROGRESS NOTES
4 Eyes Admission Assessment     I agree as the admission nurse that 2 RN's have performed a thorough Head to Toe Skin Assessment on the patient. ALL assessment sites listed below have been assessed on admission. Areas assessed by both nurses:   [x]   Head, Face, and Ears   [x]   Shoulders, Back, and Chest  [x]   Arms, Elbows, and Hands   [x]   Coccyx, Sacrum, and Ischium  [x]   Legs, Feet, and Heels        Does the Patient have Skin Breakdown? Yes a wound was noted on the Admission Assessment and an LDA was Initiated documentation include the Promise-wound, Wound Assessment, Measurements, Dressing Treatment, Drainage, and Color\",         Hi Prevention initiated:  Yes  Wound Care Orders initiated: Yes  [address] nurse consulted for Pressure Injury (Stage 3,4, Unstageable, DTI, NWPT, and Complex wounds) or Hi score 18 or lower:  Yes    Wounds on buttocks and bilateral lower  Extremities. Podiatry already consulted for foot wounds, see notes.      Nurse 1 eSignature: Electronically signed by Donna Villasenor RN on 6/2/22 at 6:25 PM EDT    **SHARE this note so that the co-signing nurse is able to place an eSignature**    Nurse 2 eSignature: Electronically signed by Meli Gama RN on 6/2/22 at 7:07 PM EDT

## 2022-06-02 NOTE — PROGRESS NOTES
Physical Therapy  Facility/Department: [address] [place] [address]  Physical Therapy Initial Assessment    Name: Jackee Felty  : 1940  MRN: [id number]  Date of Service: 2022    Discharge Recommendations: Jackee Felty scored a 8/24 on the AM-PAC short mobility form. Current research shows that an AM-PAC score of 17 or less is typically not associated with a discharge to the patient's home setting. Based on the patient's AM-PAC score and their current functional mobility deficits, it is recommended that the patient have 3-5 sessions per week of Physical Therapy at d/c to increase the patient's independence. Please see assessment section for further patient specific details. If patient discharges prior to next session this note will serve as a discharge summary. Please see below for the latest assessment towards goals. PT Equipment Recommendations  Equipment Needed:  (defer)      Patient Diagnosis(es): The primary encounter diagnosis was Altered mental status, unspecified altered mental status type. A diagnosis of Pneumonia of lower lobe due to infectious organism, unspecified laterality was also pertinent to this visit. Past Medical History:  has a past medical history of Hyperlipidemia and Hypertension. Past Surgical History:  has no past surgical history on file. Assessment   Body Structures, Functions, Activity Limitations Requiring Skilled Therapeutic Intervention: Decreased functional mobility ; Decreased endurance;Decreased balance;Decreased cognition;Decreased strength;Decreased safe awareness  Assessment: pt is an 80 yo male from nursing home with AMS and unknown baseline due to pt being a poor historian. pt requiring assist of 2 for bed mobility and transfers at [facility]. pt demonstrating poor sitting and standing balance with L lateral lean, decreased trunk control, and increased L sided weakness. pt limited by decreased tolerance and cognition and is a high fall risk. Recommend pt seek further IP PT upon dc if below baseline or seek ECF if inappropriate for SNF. Will follow throughout stay  Treatment Diagnosis: dec functional mobility  Therapy Prognosis: Fair  Decision Making: Medium Complexity  Requires PT Follow-Up: Yes  Activity Tolerance  Activity Tolerance: Patient tolerated evaluation without incident;Patient limited by endurance; Patient limited by fatigue  Activity Tolerance Comments: pt with very limited tolerance, fatiguing with EOB sitting and only able to maintain stance for ~10 seconds before needing to sit and return to supine     Plan   Plan  Plan:  (2-5)  Current Treatment Recommendations: Strengthening,ROM,Balance training,Transfer training,Functional mobility training,Neuromuscular re-education,Safety education & training,Home exercise program,Patient/Caregiver education & training,Therapeutic activities,Gait training  Safety Devices  Type of Devices: Nurse notified,Sitter present,Left in bed,All brendan prominences offloaded,Bed alarm in place,Call light within reach     Restrictions  Position Activity Restriction  Other position/activity restrictions: up with assist     Subjective   General  Chart Reviewed:  Yes  Additional Pertinent Hx: pt is an 78 yo male presenting with AMS from nursing home  Referring Practitioner: Karen Harrell MD  Diagnosis: hospital aquired bacterial pneumonia  Follows Commands: Impaired (pt with confusion throughout session)  Subjective  Subjective: pt supine in bed and agreeable to PT. pt difficult to understand and is a poor historian despite being oriented x3         Social/Functional History  Social/Functional History  Type of Home: Facility (unknown if LTC or SNF?)  Home Equipment: Cane,Walker, 4 wheeled  Has the patient had two or more falls in the past year or any fall with injury in the past year?: Yes  Receives Help From:  (reports dtr visits)  ADL Assistance: Independent  Homemaking Assistance: Independent (reports he does his own laundry and meals)  Ambulation Assistance: Independent (using 4 wh walker or cane)  Transfer Assistance: Independent  Additional Comments: pt is poor historian. pt reports he is from home and IND prior to admission doing all household tasks and ambulating with 4WW or cane. although per chart review pt from nursing home (unknown length of stay).  unknown if pt is currently ambulatory or extent of what the facility is currently assisting pt with  Vision/Hearing  Vision  Vision: Impaired  Vision Exceptions: Wears glasses at all times  Hearing  Hearing: Exceptions to [facility]  Hearing Exceptions: Hard of hearing/hearing concerns    Cognition   Orientation  Overall Orientation Status: Impaired  Orientation Level: Oriented to place;Oriented to person;Oriented to time;Disoriented to situation  Cognition  Overall Cognitive Status: Exceptions     Objective   Heart Rate: 60  Heart Rate Source: Monitor  BP: (!) 150/76  BP Location: Left upper arm  Patient Position: Semi fowlers  MAP (Calculated): 100.67  Resp: 21  SpO2: 98 %  O2 Device: Nasal cannula                 Strength RLE  Comment: grossly weak, 3-/5  Strength LLE  Comment: grossly weak, 3-/5        Balance  Sitting: Impaired (x 7-8 minutes - mostly Max A w/ LOB to L and post; intermittent Min A; flexed trunk; unable to correct sitting balance despite physical/verbal/visual cues)  Standing: Impaired (Max A of 2 (unable to release L hand from bedrail to walker, L knee flexed))  Bed mobility  Rolling to Left: Maximum assistance  Rolling to Right: Maximum assistance  Supine to Sit: Moderate assistance (HOB elevated, max VC for sequencing)  Sit to Supine: Maximum assistance;2 Person assistance (max A of 2)  Scooting: Maximal assistance  Transfers  Sit to Stand: 2 Person Assistance;Dependent/Total (mod-max A of 2 at )  Stand to sit: Dependent/Total;2 Person Assistance  Comment: unable to maintain stance longer than ~10 sec or attempt at taking steps  Ambulation  Comments: unable to attempt ambulation on this day 2/2 to weakness/ fatigue     Balance  Sitting - Static: Poor;+ (sitting balance EOB maintained for 7-8 min with brief periods of min A but mostly required max A with L lateral lean and flexed posture)  Sitting - Dynamic: Poor  Standing - Static: Poor (mod- max A of 2 at RW with L knee buckling, L lateral lean and poor standing tolerance. pt needing to sit ~10 seconds into standing.  pt with flexed posture and inability to come to full upright posture)  Standing - Dynamic: Poor           OutComes Score                                                  AM-PAC Score  AM-PAC Inpatient Mobility Raw Score : 8 (06/02/22 1009)  AM-PAC Inpatient T-Scale Score : 28.52 (06/02/22 1009)  Mobility Inpatient CMS 0-100% Score: 86.62 (06/02/22 1009)  Mobility Inpatient CMS G-Code Modifier : CM (06/02/22 1009)          Goals  Short Term Goals  Time Frame for Short term goals: by dc  Short term goal 1: pt will perform bed mobility with min A  Short term goal 2: pt will perform sit <> stand transfers at [address] [place] [address] with mod A  Short term goal 3: pt will perform bed <> chair transfers with LRAD and mod A  Patient Goals   Patient goals : unable to state       Education  Patient Education  Education Given To: Patient  Education Provided: Plan of Care;Role of Therapy  Education Method: Demonstration;Verbal  Barriers to Learning: Cognition  Education Outcome: Continued education needed      Therapy Time   Individual Concurrent Group Co-treatment   Time In 0905         Time Out 0934         Minutes 29             Timed Code Treatment Minutes:  14 min      Total Treatment Minutes:  29 min       Chante Block, PT

## 2022-06-02 NOTE — CONSULTS
Clinical Pharmacy Progress Note  Medication History     Admit Date: 6/1/2022    Pharmacy has been consulted to review this patient's home medication list by Dr. Chucho Ray. List of current medications the patient is taking is in progress, and home medication list in Epic has been updated to reflect the changes noted below. Source(s) of information: discharge summary from [address] [place] [address] on 5/16/22. Patient from [facility] ([phone])-- tried 3 times to get med list faxed but no list ever sent, will f/u tomorrow. Patient was discharged on the following medications:  · Ipratropium-albuterol 0.5-2.5 (3) MG/3ML nebulizer solution  3 mLs by Nebulization route every 4 (four) hours as needed. · Tiotropium 1.25 MCG/ACT inhalation spray  Use 2 puffs in the morning. · Metoprolol succinate 25 mg extended-release tablet  Take 1 tablet by mouth in the morning. · Apixaban 5 mg starter pack  1 Tablet 2 times a day  · Fish oil 1000 MG Caps  1 capsule daily   · Lisinopril 40 MG Tabs  1 tablet daily   · multivitamin w/ minerals Tabs  · Simvastatin 20 MG Tabs  1 tablet daily   · Tamsulosin 0.4 MG Caps   1 tablet daily     Unable to tell if patient taking the following:  · Aspirin 325mg   · Calcium carbonate  · Hydrochlorothiazide    Thanks for consulting pharmacy!   Sam Juárez PharmD  Pharmacy Resident   Please call with questions [phone]  6/2/2022 3:05 PM

## 2022-06-02 NOTE — H&P
Internal Medicine  PGY 1  History & Physical      CC Fatigue with AMS     History Obtained From:  patient    HISTORY OF PRESENT ILLNESS:  Pt is a 78 yo M with a PMHx of HLD, HTN , CAD s/p cardiac cath 2012 with stents, GERD, PVD who presents with fatigue and SOB. Patient was recently discharged for PE, UTI and MADDI with hypernatremia and was treated with Ab and discharged to SNF. COPD is suspected as an underlying cause along with PE. The daughter was bed side who was saying the patient never fully recovered and was more altered today. Patient was brought to the hospital for further work up. Patient was a long time smoker who quit only  A few months prior to his admission at [facility] in May 2022. Denies any ETOH or illicit drug abuse. Pt was feeling more SOB but denied any pain, fevers, chills, nausea, vomiting, dysuria or hematuria. Pt has had poor PO intake the past few days. ED course  Patient was seen at bedside. Was AAOx3 but was difficult to understand. The patient daughter and the patient was spoken to about palliative care and possible hospice. She agreed she would like to speak to someone as she is a nurse. Full code for now. Pt vitals A feb, hr 72, rr 25, O2 95% on 2L. VBG 7.416/68/34. Labs appreciated which showed LA 1.6, K 5.7 which was repeated, Cr 0.9, Pro , Trop 0.09, WBC 3.4, Hg 10.6, , UA showed 3-5 wbc with +2 bacteria no LE. EKG unremarkable. CT head was negative for any acute events. CXR showed infiltration at the left lung base but in my views does not look different from prior cxr. Pt will be admitted for possible HAP PNA. Past Medical History:        Diagnosis Date    Hyperlipidemia     Hypertension    ·     Past Surgical History:    · History reviewed. No pertinent surgical history. Medications Priorto Admission:    · Not in a hospital admission. Allergies:  Penicillins    Social History:   · TOBACCO:   reports that he has never smoked.  He has never used smokeless tobacco.  · ETOH:   reports no history of alcohol use. · DRUGS : None  · Patient currently lives in a nursing home  ·   Family History:   · History reviewed. No pertinent family history. Review of Systems   Constitutional: Positive for activity change and appetite change. Negative for chills and fever. HENT: Negative. Respiratory: Positive for cough and shortness of breath. Negative for chest tightness. Cardiovascular: Negative for chest pain and leg swelling. Neurological: Positive for weakness. All other systems reviewed and are negative. ROS: A 10 point review of systems was conducted, significant findings as noted in HPI. Physical Exam  Vitals and nursing note reviewed. Constitutional:       Appearance: He is ill-appearing. HENT:      Head: Normocephalic. Nose: Nose normal.      Mouth/Throat:      Mouth: Mucous membranes are dry. Pharynx: Oropharynx is clear. Eyes:      Pupils: Pupils are equal, round, and reactive to light. Comments: Strabismus    Cardiovascular:      Rate and Rhythm: Normal rate and regular rhythm. Pulses: Normal pulses. Heart sounds: Normal heart sounds. Pulmonary:      Effort: Pulmonary effort is normal.      Breath sounds: Normal breath sounds. Abdominal:      General: Abdomen is flat. Bowel sounds are normal. There is no distension. Palpations: Abdomen is soft. Tenderness: There is no abdominal tenderness. Musculoskeletal:         General: Normal range of motion. Comments: Bilateral LE swelling in the feet    Skin:     General: Skin is warm. Capillary Refill: Capillary refill takes less than 2 seconds. Neurological:      General: No focal deficit present. Mental Status: He is oriented to person, place, and time. Cranial Nerves: No cranial nerve deficit. Sensory: No sensory deficit. Motor: Weakness present.    Psychiatric:         Mood and Affect: Mood normal.       Physical -     -MRSA swab sent    - Blood cultures x2  -Consult to palliative care- patient daughter POA interested in hospice   -Dietary consulted- Patient has not been eating or drinking recently   -PT OT   -IV fluids 100/h NS   -Continue with Eliquis BID from PE in May 2022     NSTEMI Type 2   -Trop elevated - will trend. Likely demand ischemia from poor PO intake. Echo showed 60-65% with grade 1 diastolic.      Bilateral feet swelling  -Podiatry consulted     BPH  - Continue with flomax     HLD  -Continue with Lipitor 10mg     HTN   -Continue with Lopressor 12.5mg Daily ( was on 25mg but was changed on DC from [place])   -Continue lisinopril 40mg and Hydrochlorothiazide     CAD  -Was on ASA 324mg but now hes off since hes been on Eliquis     MCV  -Folate and B12 sent       Will discuss with attending physician      Code Status:Full code  FEN: Adult Diet   PPX: Lovenox   DISPO: AUGUSTINE Fletcher MD  6/1/2022,  10:55 PM

## 2022-06-02 NOTE — ED NOTES
Speckled top, White/Yellow and Grey top urine tubes collected and sent to seng Croft RN  06/01/22 2142

## 2022-06-02 NOTE — PROGRESS NOTES
Speech Language Pathology  Facility/Department: [address] [place] [address]   CLINICAL BEDSIDE SWALLOW EVALUATION    NAME: Bob Guillen  : 1940  MRN: [id number]    ADMISSION DATE: 2022  ADMITTING DIAGNOSIS: has Syncope and collapse; HTN (hypertension); Stage 3 chronic kidney disease ([facility] [address] CAD (coronary artery disease); and Hospital-acquired bacterial pneumonia on their problem list.  ONSET DATE: 2022    Recent Chest Xray: (2022)  mpression       Ill-defined airspace density in the left base, consistent with early infiltrate or atelectasis.       No other acute cardiopulmonary findings. CT of Head: (2022)  Impression       Cerebral atrophy.       No acute intracranial findings. Date of Eval: 2022  Evaluating Therapist: BEL Barron    Current Diet level:  Current Diet : NPO      Primary Complaint  Patient Complaint: Patient did not state; referred d/t coughing with PO intake, concern for aspiration    Pain:  Pain Assessment  Pain Assessment: Gillis-Baker FACES  Gillis-Baker Pain Rating: No hurt    Reason for Referral  Bob Guillen was referred for a bedside swallow evaluation to assess the efficiency of his swallow function, identify signs and symptoms of aspiration and make recommendations regarding safe dietary consistencies, effective compensatory strategies, and safe eating environment. Impression  Dysphagia Diagnosis: Suspected needs further assessment  Dysphagia Impression : Suspect oropharyngeal dysphagia, needs further assessment. With patient seated up in bed on 3 L O2 via nc (awake and participatory, but lethargic and requiring cues), assessed tolerance ice chips, thins via tsp/straw, puree. Pt with positive oral acceptance, slowed oral prep with holding, positive but seemingly delayed laryngeal swallow movement, oral stasis, inconsistent wet reactive cough.  Recommend continue NPO, with frequent oral hygiene w/ suction kit, ice chips with nursing. Needs instrumental assessment, however given current lethargy and scheduling limitations, recommend defer till tomorrow. Will continue to follow. Dysphagia Outcome Severity Scale: Level 1: Severe dysphagia- NPO. Unable to tolerate any PO safely     Treatment Plan  Requires SLP Intervention: Yes  Duration of Treatment: 1-2 wks or LOS  D/C Recommendations: Ongoing speech therapy is recommended during this hospitalization       Recommended Diet and Intervention  NPO  Recommended Form of Meds: Via alternative means of nutrition  Recommendations: NPO;Dysphagia treatment  Therapeutic Interventions: Diet tolerance monitoring;Patient/Family education    Compensatory Swallowing Strategies  NPO    Treatment/Goals  Short-term Goals  Timeframe for Short-term Goals: 1-2 wks or LOS  Goal 1: Patient will participate in further assessment of swallow function as appropriate. Goal 2: Patient/caregiver will demonstrate understanding of swallowing concerns/recommendations. General  Chart Reviewed: Yes  Comments: Per admitting H&P (06/01/2022): 'Pt is a 78 yo M with a PMHx of HLD, HTN , CAD s/p cardiac cath 2012 with stents, GERD, PVD who presents with fatigue and SOB. Patient was recently discharged for PE, UTI and MADDI with hypernatremia and was treated with Ab and discharged to SNF. COPD is suspected as an underlying cause along with PE. The daughter was bed side who was saying the patient never fully recovered and was more altered today. Patient was brought to the hospital for further work up. Patient was a long time smoker who quit only  A few months prior to his admission at [facility] in May 2022. Denies any ETOH or illicit drug abuse. Pt was feeling more SOB but denied any pain, fevers, chills, nausea, vomiting, dysuria or hematuria. Pt has had poor PO intake the past few days. '  Subjective  Subjective: Received patient resting in bed, initially lethargic, blanket over head. Sitter present.  Able to rouse, repositioned upright in bed. Participated with cues, but remained lethargic throughout. Behavior/Cognition: Lethargic; Alert  Respiratory Status: O2 via nasual cannula  O2 Device: Nasal cannula  Liters of Oxygen: 3 L  Communication Observation: Functional  Follows Directions: Simple  Dentition:  (unable to fully assess)  Patient Positioning: Upright in bed  Baseline Vocal Quality: Wet  Volitional Cough: Strong; Wet  Prior Dysphagia History: None found in chart review. Vision/Hearing  Vision  Vision Exceptions: Wears glasses at all times  Hearing  Hearing: Exceptions to [facility]  Hearing Exceptions: Hard of hearing/hearing concerns    Oral Motor Deficits  Oral/Motor  Oral Hygiene: Moist    Oral Phase Dysfunction  Slowed oral prep, oral holding     Indicators of Pharyngeal Phase Dysfunction  Wet vocal quality, reactive cough    Prognosis  Individuals consulted  Consulted and agree with results and recommendations: RN;Patient  RN Name: Javid Bazan    Education  Patient Education: Educated pt to purpose of visit, swallow function, recommendations, strategies. Patient Education Response: Needs reinforcement  Safety Devices in place: Yes  Type of devices: All fall risk precautions in place; Bed alarm in place; Left in bed;Sitter present       Therapy Time  SLP Individual Minutes  Time In: 0655  Time Out: [address]  Minutes: 20     SLP Total Treatment Time  Timed Code Treatment Minutes: 0 Minutes  Total Treatment Time: 20     Plan  Diet Recommendations:     - NPO  - consider temporary alternative means nutrition/hydration  - oral hygiene 2-3x/day, ice chips w/ nursing  - tentatively plan for MBS tomorrow (pt too lethargic to participate at this time)    Discharge Plan:  TBD  Discussed with RN, April. Needs within reach. Electronically Signed by:  Naz Keenan M.A., Dwayne Pacheco   Speech-Language Pathologist  Pager #[phone]    This document will serve as a discharge summary if pt discharges before next treatment.

## 2022-06-02 NOTE — PROGRESS NOTES
Patient arrived to room 6326 from ED. Vitals stable. On 4 L of oxygen. Connected to heart monitor. Patient alert to self. Bed alarm on for patient safety. Call light in reach, bed locked and in lowest position.

## 2022-06-02 NOTE — PROGRESS NOTES
Cervical back: Normal range of motion and neck supple. Right lower leg: Edema present. Left lower leg: Edema present. Skin:     General: Skin is warm. Neurological:      Mental Status: He is alert. LABS:    CBC:   Recent Labs     06/01/22 1946   WBC 3.4*   HGB 10.6*   HCT 32.9*      .7*     Renal:    Recent Labs     06/01/22 1946 06/01/22 2256     --    K 5.7* 3.7     --    CO2 35*  --    BUN 20  --    CREATININE 0.9  --    GLUCOSE 123*  --    CALCIUM 8.9  --    ANIONGAP 7  --      Hepatic: No results for input(s): AST, ALT, BILITOT, BILIDIR, PROT, LABALBU, ALKPHOS in the last 72 hours. Troponin:   Recent Labs     06/01/22 1946   TROPONINI 0.09*     BNP: No results for input(s): BNP in the last 72 hours. Lipids: No results for input(s): CHOL, HDL in the last 72 hours. Invalid input(s): LDLCALCU, TRIGLYCERIDE  ABGs:  No results for input(s): PHART, GHS9QSF, PO2ART, FCP4KKQ, BEART, THGBART, R7WCSMZV, GOV1DGU in the last 72 hours. INR: No results for input(s): INR in the last 72 hours. Lactate: No results for input(s): LACTATE in the last 72 hours. Cultures:  -----------------------------------------------------------------  RAD:   CT HEAD WO CONTRAST   Final Result      Cerebral atrophy. No acute intracranial findings. XR CHEST PORTABLE   Final Result      Ill-defined airspace density in the left base, consistent with early infiltrate or atelectasis. No other acute cardiopulmonary findings.              Assessment/Plan:     Pt is a 80 yo M with a PMHx of HLD, HTN , CAD s/p cardiac cath 2012 with stents, GERD, PVD who presents with fatigue and SOB.     Possible Aspiration event VS PNA   Hx COPD   Hx PE   Patient O2 requirements have increased while at the SNF according to the daughter, however patient is at baseline O2 since being discharged at [facility] after a PE. CXR showed left sided infilatrates as read by radiologist but unclear to myself as it looks similar to previous CXR. Does not look infectious to me or an acute issue. Issues seem more chronic. No Leukocytosis or recorded fevers. VBG 7.41/68/30      -Sent Procal/ ESR/CRP out   -Respiratory cultures and viral panel  -Cefepime and Vanc ( 6/1 -     - d/c abx if procal is negative, PNA low suspicion   -MRSA swab sent    - Blood cultures x2  -Consult to palliative care- patient daughter POA interested in hospice   -Dietary consulted- Patient has not been eating or drinking recently   -PT OT   -IV fluids 100/h NS   -Continue with Eliquis BID from PE in May 2022   - consulted palliative care     NSTEMI Type 2   -Trop elevated - will trend. Likely demand ischemia from poor PO intake.  Echo showed 60-65% with grade 1 diastolic.      Bilateral feet swelling  -Podiatry consulted      BPH  - Continue with flomax      HLD  -Continue with Lipitor 10mg      HTN   -Continue with Lopressor 12.5mg Daily ( was on 25mg but was changed on DC from [employer])   -Continue lisinopril 40mg and Hydrochlorothiazide      CAD  -Was on ASA 324mg but now hes off since hes been on Eliquis      MCV  -Folate and B12 sent       Code Status: full code  FEN: npo   PPX: lovenox  DISPO:     Alyssa Shen MD, PGY-1  06/02/22  8:17 AM    This patient has been staffed and discussed with Darya Jeffrey MD.

## 2022-06-02 NOTE — CONSULTS
Department of Podiatry Consult Note  Resident       Reason for Consult: lower extremity in the feet swelling  Requesting Physician:  Beatriz Morales MD    CHIEF COMPLAINT: Hospital-acquired pneumonia    HISTORY OF PRESENT ILLNESS:      The patient is a 76-year-old male with significant past medical history as listed below. Patient was admitted for hospital-acquired pneumonia and podiatry was consulted for lower extremity in the feet swelling. Patient states that he was at the nursing home when they put the dressings to his feet. Patient states that they have been changed in about 4 days. Patient states that the dressings usually just cover the legs and leave the feet alone. Patient denies any pain coming from bilateral legs but just complains of the swelling. Patient denies any other pedal complaints. Patient denies any N/V/F/C/SoB. Past Medical History:        Diagnosis Date    Hyperlipidemia     Hypertension      Past Surgical History:    History reviewed. No pertinent surgical history.   Current Medications:    Current Facility-Administered Medications: vancomycin (VANCOCIN) 750 mg in dextrose 5 % 250 mL IVPB, 750 mg, IntraVENous, Q12H  cefepime (MAXIPIME) 2000 mg IVPB minibag in NS, 2,000 mg, IntraVENous, Q8H  sodium chloride flush 0.9 % injection 5-40 mL, 5-40 mL, IntraVENous, 2 times per day  sodium chloride flush 0.9 % injection 5-40 mL, 5-40 mL, IntraVENous, PRN  0.9 % sodium chloride infusion, , IntraVENous, PRN  ondansetron (ZOFRAN-ODT) disintegrating tablet 4 mg, 4 mg, Oral, Q8H PRN **OR** ondansetron (ZOFRAN) injection 4 mg, 4 mg, IntraVENous, Q6H PRN  polyethylene glycol (GLYCOLAX) packet 17 g, 17 g, Oral, Daily PRN  acetaminophen (TYLENOL) tablet 650 mg, 650 mg, Oral, Q6H PRN **OR** acetaminophen (TYLENOL) suppository 650 mg, 650 mg, Rectal, Q6H PRN  0.9 % sodium chloride infusion, , IntraVENous, Continuous  aspirin tablet 325 mg, 325 mg, Oral, Daily  metoprolol succinate (TOPROL XL) extended release tablet 12.5 mg, 12.5 mg, Oral, Daily  atorvastatin (LIPITOR) tablet 10 mg, 10 mg, Oral, Daily  tamsulosin (FLOMAX) capsule 0.4 mg, 0.4 mg, Oral, Daily  lisinopril (PRINIVIL;ZESTRIL) tablet 40 mg, 40 mg, Oral, Daily  apixaban (ELIQUIS) tablet 5 mg, 5 mg, Oral, BID  OLANZapine zydis (ZYPREXA) disintegrating tablet 5 mg, 5 mg, Oral, Nightly  melatonin disintegrating tablet 10 mg, 10 mg, Oral, Nightly  Allergies:   Penicillins  Social History:    TOBACCO:   reports that he has never smoked. He has never used smokeless tobacco.  Family History:   History reviewed. No pertinent family history. REVIEW OF SYSTEMS:      Review of Systems: Pertinent positive and negative findings as documented in the HPI, otherwise all other systems were reviewed and were negative. PHYSICAL EXAM:      Vitals:    BP (!) 154/71   Pulse 64   Temp 97.7 °F (36.5 °C) (Oral)   Resp 28   Ht 6' 5\" (1.956 m)   Wt 193 lb (87.5 kg)   SpO2 100%   BMI 22.89 kg/m²     LABS:   Recent Labs     06/01/22 1946   WBC 3.4*   HGB 10.6*   HCT 32.9*        Recent Labs     06/01/22 1946 06/01/22  2256     --    K 5.7* 3.7     --    CO2 35*  --    BUN 20  --    CREATININE 0.9  --      No results for input(s): PROT, INR, APTT in the last 72 hours. VASCULAR: DP pulses were nonpalpable due to pedal edema but PT pulses were palpable +2/4. DP pulses upon Doppler examination around the biphasic bilaterally. Saul Figueroa CFT is brisk to the digits of the foot b/l. Skin temperature is warm to cool from proximal to distal with no focal increase in temperature noted. +3 pitting edema noted to patient's feet bilaterally but no edema noted to patient's legs. No erythema noted. No pain with calf compression b/l. NEUROLOGIC: Gross and epicritic sensation is diminished b/l. Protective sensation is diminished at all pedal sites b/l. DERMATOLOGIC: Chronic dermatologic changes noted to patient's bilateral lower extremities.   Nails 1-5 b/l are within normal limits of length, thickness, and color. Webspaces 1-4 b/l are clean, dry, and intact. Hyperkeratosis noted to the dorsal aspect of the second digit bilaterally. No open wounds noted. No subcutaneous nodules, rashes, or other skin lesions noted. Patient provided verbal consent for pictures obtained today:            MUSCULOSKELETAL: No pain to palpation noted. Muscle strength 4/5 for all pedal muscle groups. Ankle joint ROM is decreased in dorsiflexion with the knee extended. Rigid hammertoes 2 through 5 bilateral.  IMPRESSION/RECOMMENDATIONS:      1. Edema 2/2 poor compression   2. Peripheral vascular disease    -Patient examined and evaluated at bedside   -Hypertensive but other VSS, no leukocytosis noted (WBC 3.4)  -Wound culture not taken as no open wounds noted. -Dressing applied to bilateral LE consisting of one 4 inch and one 6 inch Ace.  -No Abx necessary from podiatric perspective as no open wounds noted but patient can continue antibiotics for his hospital-acquired pneumonia  -Patient is to be weightbearing as tolerated      Dispo: Patient with pedal edema secondary to poor compression. Recommend patient's legs will be wrapped for continuous compression for 3 days. After 3 days patient can transition to RUBÉN hose bilaterally. Patient is remain weightbearing as tolerated to bilateral lower extremities. Podiatry will sign off as no open wounds noted. Please reconsult as needed if any changes arise in his lower extremities. - Will sign off on the patient. If any other pedal problems occur please contact us. Thank you for the opportunity to take part in the patient's care.      - The patient was seen and evaluated at bedside with Dr. Ten Antonio, [address] [place] [address]  Podiatric Resident, PGY-1  Pager #: [phone] or Perfect Serve

## 2022-06-02 NOTE — CARE COORDINATION
Case Management Assessment           Initial Evaluation                Date / Time of Evaluation: 6/2/2022 4:41 PM                 Assessment Completed by: VICTORINA Lemus, BELLOW    Patient Name: Efraín Casas     YOB: 1940  Diagnosis: Altered mental status, unspecified altered mental status type [R41.82]  Hospital-acquired bacterial pneumonia [J15.9]  Pneumonia of lower lobe due to infectious organism, unspecified laterality [J18.9]     Date / Time: 6/1/2022  6:49 PM    Patient Admission Status: Inpatient    If patient is discharged prior to next notation, then this note serves as note for discharge by case management. Current PCP: ADAN Madera (Inactive)  Clinic Patient: No    Chart Reviewed: Yes  Patient/ Family Interviewed: Yes    Initial assessment completed at bedside with:     Hospitalization in the last 30 days: No    Emergency Contacts:  Extended Emergency Contact Information  Primary Emergency Contact: Elissa Nava [address] [address] Phone: [phone]  Relation: Child  Secondary Emergency Contact: Blake Antunez  [place] Phone: [phone]  Relation: Child    Advance Directives:   Code Status: Full [address]: Yes  Agent: Sandy Cheung Number: [phone]    Financial  Payor: Gomez Matthew / Plan: Kingston Thomas / Product Type: *No Product type* /     Pre-cert required for SNF: Yes    Pharmacy  No Pharmacies Listed    Potential assistance Purchasing Medications: Potential Assistance Purchasing Medications: No  Does Patient want to participate in local refill/ meds to beds program?:      Meds To Beds General Rules:  1. Can ONLY be done Monday- Friday between 8:30am-5pm  2. Prescription(s) must be in pharmacy by 3pm to be filled same day  3. Copy of patient's insurance/ prescription drug card and patient face sheet must be sent along with the prescription(s)  4. Cost of Rx cannot be added to hospital bill.  If financial assistance is needed, please contact unit  or ;  or  CANNOT provide pharmacy voucher for patients co-pays  5. Patients can then  the prescription on their way out of the hospital at discharge, or pharmacy can deliver to the bedside if staff is available. (payment due at time of pick-up or delivery - cash, check, or card accepted)     Able to afford home medications/ co-pay costs: Yes    ADLS  Support Systems: Children,Family Members    PT AM-PAC: 8 /24  OT AM-PAC: 9 /24    [place]: [facility] - SNF    Steps: none    Plans to RETURN to current housing: Yes  Barriers to RETURNING to current housing: NA      Durable Medical Equipment  Equipment: cane and walker    Home Oxygen and Respiratory Equipment  Has HOME OXYGEN prior to admission: Yes    Dialysis  Active with HD/PD prior to admission: No      DISCHARGE PLAN:  Disposition:  LTC with hospice    Transportation PLAN for discharge:  EMS    Factors facilitating achievement of predicted outcomes: Family support and Caregiver support    Barriers to discharge: Medical complications    Additional Case Management Notes:  CM called daughter/POARafiq [phone]. Pt is from [address] [place] [address], but was scheduled to transfer to [address] [place] [address] [facility] tomorrow. Before SNF, pt had been living at home with family, still independent with some ADLs, but gradually detiriorating this past year. Pt has been refusing PT/OT at SNF, and not eating/drinking. Pt started on 2L O2, no on 4L. Rafiq Mai is agreeable to LTC with hospice. CM will coordinate with Mauri.            The Plan for Transition of Care is related to the following treatment goals of Altered mental status, unspecified altered mental status type [R41.82]  Hospital-acquired bacterial pneumonia [J15.9]  Pneumonia of lower lobe due to infectious organism, unspecified laterality [J18.9]    The Patient and/or patient representative Alen Guardado and his family were provided with a choice of provider and agrees with the discharge plan Yes    Freedom of choice list was provided with basic dialogue that supports the patient's individualized plan of care/goals and shares the quality data associated with the providers.  Yes    Care Transition patient: No    VICTORINA Frausto, [facility]  Case Management Department  Ph: [phone]   Fax: [phone]

## 2022-06-02 NOTE — PROGRESS NOTES
4 Eyes Admission Assessment     I agree as the admission nurse that 2 RN's have performed a thorough Head to Toe Skin Assessment on the patient. ALL assessment sites listed below have been assessed on admission. Areas assessed by both nurses:  [x]   Head, Face, and Ears   [x]   Shoulders, Back, and Chest  [x]   Arms, Elbows, and Hands   [x]   Coccyx, Sacrum, and Ischium  [x]   Legs, Feet, and Heels        Does the Patient have Skin Breakdown? Two stage 2's on coccyx present on admission: mepilex applied, turned side to side. Pt positions self back onto back (flat) after being turned. Education attempt made, pt mental status not conducive to learning at this time. Assisted turns continue.         Hi Prevention initiated:  Yes   Wound Care Orders initiated:  No      [facility] nurse consulted for Pressure Injury (Stage 3,4, Unstageable, DTI, NWPT, and Complex wounds) or Hi score 18 or lower:  No      Nurse 1 eSignature: Electronically signed by Dax Marvin RN on 6/2/22 at 11:58 AM EDT    Nurse 2 eSignature: Electronically signed by Aracelis Griffiths RN on 6/2/22 at 1:15 PM EDT

## 2022-06-02 NOTE — ED NOTES
Patient transferred to hospital bed for comfort. Bed alarm placed.      Laura Hastings RN  06/02/22 9002

## 2022-06-02 NOTE — PROGRESS NOTES
Bedside swallow eval attempted with no straw and 3oz water. Pt took small drink, within a few seconds pt began coughing and clearing throat, scant volume of water ran out of mouth during episode. Pt made NPO, physician notified, speech eval ordered. Pt's sp02 97%% after episode.

## 2022-06-03 ENCOUNTER — APPOINTMENT (OUTPATIENT)
Dept: GENERAL RADIOLOGY | Age: 82
DRG: 177 | End: 2022-06-03
Payer: MEDICARE

## 2022-06-03 ENCOUNTER — APPOINTMENT (OUTPATIENT)
Dept: CT IMAGING | Age: 82
DRG: 177 | End: 2022-06-03
Payer: MEDICARE

## 2022-06-03 PROBLEM — R62.7 FTT (FAILURE TO THRIVE) IN ADULT: Status: ACTIVE | Noted: 2022-06-03

## 2022-06-03 LAB
ANION GAP SERPL CALCULATED.3IONS-SCNC: 8 MMOL/L (ref 3–16)
BASOPHILS ABSOLUTE: 0 K/UL (ref 0–0.2)
BASOPHILS RELATIVE PERCENT: 0.2 %
BUN BLDV-MCNC: 17 MG/DL (ref 7–20)
CALCIUM SERPL-MCNC: 9.2 MG/DL (ref 8.3–10.6)
CHLORIDE BLD-SCNC: 103 MMOL/L (ref 99–110)
CO2: 36 MMOL/L (ref 21–32)
CREAT SERPL-MCNC: 0.9 MG/DL (ref 0.8–1.3)
EOSINOPHILS ABSOLUTE: 0 K/UL (ref 0–0.6)
EOSINOPHILS RELATIVE PERCENT: 0.3 %
GFR AFRICAN AMERICAN: >60
GFR NON-AFRICAN AMERICAN: >60
GLUCOSE BLD-MCNC: 66 MG/DL (ref 70–99)
HCT VFR BLD CALC: 34.7 % (ref 40.5–52.5)
HEMOGLOBIN: 11 G/DL (ref 13.5–17.5)
LYMPHOCYTES ABSOLUTE: 0.5 K/UL (ref 1–5.1)
LYMPHOCYTES RELATIVE PERCENT: 11.7 %
MAGNESIUM: 2 MG/DL (ref 1.8–2.4)
MCH RBC QN AUTO: 33.3 PG (ref 26–34)
MCHC RBC AUTO-ENTMCNC: 31.8 G/DL (ref 31–36)
MCV RBC AUTO: 104.6 FL (ref 80–100)
MONOCYTES ABSOLUTE: 0.3 K/UL (ref 0–1.3)
MONOCYTES RELATIVE PERCENT: 6.7 %
NEUTROPHILS ABSOLUTE: 3.3 K/UL (ref 1.7–7.7)
NEUTROPHILS RELATIVE PERCENT: 81.1 %
PDW BLD-RTO: 15.4 % (ref 12.4–15.4)
PLATELET # BLD: 123 K/UL (ref 135–450)
PMV BLD AUTO: 9 FL (ref 5–10.5)
POTASSIUM REFLEX MAGNESIUM: 4.2 MMOL/L (ref 3.5–5.1)
RBC # BLD: 3.32 M/UL (ref 4.2–5.9)
SODIUM BLD-SCNC: 147 MMOL/L (ref 136–145)
VANCOMYCIN TROUGH: 19 UG/ML (ref 10–20)
WBC # BLD: 4 K/UL (ref 4–11)

## 2022-06-03 PROCEDURE — 2060000000 HC ICU INTERMEDIATE R&B

## 2022-06-03 PROCEDURE — 80048 BASIC METABOLIC PNL TOTAL CA: CPT

## 2022-06-03 PROCEDURE — 74230 X-RAY XM SWLNG FUNCJ C+: CPT

## 2022-06-03 PROCEDURE — 80202 ASSAY OF VANCOMYCIN: CPT

## 2022-06-03 PROCEDURE — 4A03X5D MEASUREMENT OF ARTERIAL FLOW, INTRACRANIAL, EXTERNAL APPROACH: ICD-10-PCS | Performed by: RADIOLOGY

## 2022-06-03 PROCEDURE — 92611 MOTION FLUOROSCOPY/SWALLOW: CPT

## 2022-06-03 PROCEDURE — 83735 ASSAY OF MAGNESIUM: CPT

## 2022-06-03 PROCEDURE — 2580000003 HC RX 258

## 2022-06-03 PROCEDURE — 2580000003 HC RX 258: Performed by: INTERNAL MEDICINE

## 2022-06-03 PROCEDURE — 85025 COMPLETE CBC W/AUTO DIFF WBC: CPT

## 2022-06-03 PROCEDURE — 70498 CT ANGIOGRAPHY NECK: CPT

## 2022-06-03 PROCEDURE — 36415 COLL VENOUS BLD VENIPUNCTURE: CPT

## 2022-06-03 PROCEDURE — 6360000004 HC RX CONTRAST MEDICATION: Performed by: INTERNAL MEDICINE

## 2022-06-03 PROCEDURE — 1200000000 HC SEMI PRIVATE

## 2022-06-03 PROCEDURE — 6360000002 HC RX W HCPCS: Performed by: INTERNAL MEDICINE

## 2022-06-03 PROCEDURE — 6360000002 HC RX W HCPCS

## 2022-06-03 PROCEDURE — 94761 N-INVAS EAR/PLS OXIMETRY MLT: CPT

## 2022-06-03 RX ORDER — ACETAMINOPHEN 325 MG/1
650 TABLET ORAL EVERY 4 HOURS PRN
COMMUNITY

## 2022-06-03 RX ORDER — SODIUM PHOSPHATE, DIBASIC AND SODIUM PHOSPHATE, MONOBASIC 7; 19 G/133ML; G/133ML
1 ENEMA RECTAL DAILY PRN
COMMUNITY

## 2022-06-03 RX ORDER — BISACODYL 10 MG
10 SUPPOSITORY, RECTAL RECTAL DAILY PRN
COMMUNITY

## 2022-06-03 RX ORDER — IPRATROPIUM BROMIDE AND ALBUTEROL SULFATE 2.5; .5 MG/3ML; MG/3ML
1 SOLUTION RESPIRATORY (INHALATION) EVERY 4 HOURS PRN
COMMUNITY

## 2022-06-03 RX ORDER — SODIUM CHLORIDE 9 MG/ML
INJECTION, SOLUTION INTRAVENOUS CONTINUOUS
Status: DISCONTINUED | OUTPATIENT
Start: 2022-06-03 | End: 2022-06-06

## 2022-06-03 RX ADMIN — ENOXAPARIN SODIUM 90 MG: 100 INJECTION SUBCUTANEOUS at 12:25

## 2022-06-03 RX ADMIN — ENOXAPARIN SODIUM 90 MG: 100 INJECTION SUBCUTANEOUS at 23:02

## 2022-06-03 RX ADMIN — SODIUM CHLORIDE, PRESERVATIVE FREE 10 ML: 5 INJECTION INTRAVENOUS at 12:26

## 2022-06-03 RX ADMIN — SODIUM CHLORIDE: 9 INJECTION, SOLUTION INTRAVENOUS at 18:01

## 2022-06-03 RX ADMIN — IOPAMIDOL 75 ML: 755 INJECTION, SOLUTION INTRAVENOUS at 17:14

## 2022-06-03 RX ADMIN — THIAMINE HYDROCHLORIDE 100 MG: 100 INJECTION, SOLUTION INTRAMUSCULAR; INTRAVENOUS at 12:25

## 2022-06-03 RX ADMIN — CEFEPIME HYDROCHLORIDE 2000 MG: 2 INJECTION, POWDER, FOR SOLUTION INTRAVENOUS at 06:43

## 2022-06-03 ASSESSMENT — PAIN SCALES - WONG BAKER
WONGBAKER_NUMERICALRESPONSE: 0

## 2022-06-03 NOTE — CONSULTS
Vancomycin has been discontinued. Pharmacy will sign off consult. If medication dosing is resumed, please re-consult pharmacy. Please call with any questions.   Deyanira Kwon PharmD, BCPS  Main pharmacy: [id number]  6/3/2022 10:57 AM

## 2022-06-03 NOTE — PROGRESS NOTES
in the last 72 hours. Troponin:   Recent Labs     06/01/22  1946 06/02/22  0942 06/02/22  1541   TROPONINI 0.09* 0.10* 0.08*     BNP: No results for input(s): BNP in the last 72 hours. Lipids: No results for input(s): CHOL, HDL in the last 72 hours. Invalid input(s): LDLCALCU, TRIGLYCERIDE  ABGs:  No results for input(s): PHART, CGB7GKA, PO2ART, VDI9ART, BEART, THGBART, K0TKMILE, QXS5JYD in the last 72 hours. INR: No results for input(s): INR in the last 72 hours. Lactate: No results for input(s): LACTATE in the last 72 hours. Cultures:  -----------------------------------------------------------------  RAD:   CT HEAD WO CONTRAST   Final Result      Cerebral atrophy. No acute intracranial findings. XR CHEST PORTABLE   Final Result      Ill-defined airspace density in the left base, consistent with early infiltrate or atelectasis. No other acute cardiopulmonary findings. FL MODIFIED BARIUM SWALLOW W VIDEO    (Results Pending)       Assessment/Plan:     Pt is a 80 yo M with a PMHx of HLD, HTN , CAD s/p cardiac cath 2012 with stents, GERD, PVD who presents with fatigue and SOB.     Possible Aspiration event VS PNA   Hx COPD   Hx PE   Patient O2 requirements have increased while at the SNF according to the daughter, however patient is at baseline O2 since being discharged at [facility] after a PE. CXR showed left sided infilatrates as read by radiologist but unclear to myself as it looks similar to previous CXR. Does not look infectious to me or an acute issue. Issues seem more chronic. No Leukocytosis or recorded fevers.    VBG 7.41/68/30      - Procal 0.24   - ESR elevated, CRP elevated    - Respiratory cultures and viral panel: pending  - Cefepime and Vanc ( 6/1 - 6/3)  - d/c abx, PNA low suspicion   - MRSA swab sent    - Blood cultures x2  - Consult to palliative care- patient daughter POA interested in hospice   - Dietary consulted- Patient has not been eating or drinking

## 2022-06-03 NOTE — PROGRESS NOTES
Pt did not tolerate PO diet. Pt pocketing food. Wet cough also noted while attempting to eat and drink via teaspoon. Suction required to clear food and liquid. Mouth care performed with oral kit and suction. Pt currently sitting at 90 degrees.

## 2022-06-03 NOTE — PROGRESS NOTES
Palliative Care Chart Review  and Check in Note:     NAME:  Al Almonte  Admit Date: 6/1/2022  Hospital Day:  Hospital Day: 3   Current Code status: Full Code    Palliative care is continuing to following Mr. Tammy Garcia for symptom management,  and goals of care discussion as needed. Patient's chart reviewed today 6/3/22. Reviewed HCPOA which pt's daughter brought in, which names daughter Aundra Denver as pt's agent and son Jayden Blanca as alternate agent. Met with Aundra Denver at the bedside. Aundra Denver asked about NG tube or PEG placement, and we discussed potential benefits vs burdens of each. For now, Dr. Arash Ramos will order IVF and re-evaluate for feeding tube in the coming days. GI consult pending as well. Discussed code status with Aundra Denver today and she said pt should be DNR/DNI. The following are the currently established goals/code status, and Symptom management. Goals of care: Daughter Aundra Denver wants to continue current management and would want to consider NGT or PEG if pt continues to not be able to eat. She is also open to considering hospice.     Code status: changed to DNR/DNI (Limited- no to all interventions) per daughter/POA request    Discharge plan: d/c to [facility] when able    Roe Solomon NP  [address]

## 2022-06-03 NOTE — CONSULTS
Comprehensive Nutrition Assessment    RECOMMENDATIONS:  1. PO Diet: Continue Pureed Diet w/ thin liquids via teaspoon only; 1:1 Assist Feeds per SLP recs  2. ONS: Start [employer] & [employer] supplements TID, Ensure Enlive QD    NUTRITION ASSESSMENT:   Nutritional summary & status: Received consult for poor intake. Pt underwent MBS today & his diet was just recently advanced to a pureed diet with thin liquids via teaspoon only & 1:1 assist for feeds. No PO intakes have been documented yet. Noted pt admit w/ AMS from SNF & has not been eating or drinking much the past few days per chart review~at risk for refeeding, currently on thiamine per EMR. Will send [employer] & Boost Pudding supplements to help encourage better PO intakes. Will also send Ensure Enlive for pt to trial noting pt can have thin liquids via teaspoon only. If PO intakes do not improve, may need to consider initiating alternative nutrition. Will monitor nutritional adequacy closely throughout adm.  Admission/PMH: Admit for AMS, fatigue; PMHx of HLD, HTN , CAD s/p cardiac cath 2012 with stents, GERD, PVD     MALNUTRITION ASSESSMENT  Context of Malnutrition: Acute Illness   Malnutrition Status:  At risk for malnutrition (Comment)  Findings of the 6 clinical characteristics of malnutrition (Minimum of 2 out of 6 clinical characteristics is required to make the diagnosis of moderate or severe Protein Calorie Malnutrition based on AND/ASPEN Guidelines):  Energy Intake: Less than/equal to 50% of estimated energy requirements    Energy Intake Time: x 2 days (suspect longer)   Weight Loss %: Unable to assess    Weight loss Time: Unable to assess     NUTRITION DIAGNOSIS   Inadequate oral intake related to cognitive or neurological impairment as evidenced by poor intake prior to admission    [address] and/or Nutrient Delivery:  Continue Current Diet,Start Oral Nutrition Supplement  Nutrition Education/Counseling:  No recommendation at this time   Goals:  pt will tolerate most appropriate form of nutrition to meet >75% energy needs         Nutrition Monitoring and Evaluation:   Food/Nutrient Intake Outcomes:  Food and Nutrient Intake,Supplement Intake,Diet Advancement/Tolerance  Physical Signs/Symptoms Outcomes:  Biochemical Data,Weight,Nutrition Focused Physical Findings     OBJECTIVE DATA: Significant to nutrition assessment  · Nutrition-Focused Physical Findings: lbm pta, +2 RLE/LLE edema  · Labs: Reviewed; Na 147  · Meds: Reviewed; thiamine, glycolax prn, zofran prn  · Wounds: Wound Consult Pending       CURRENT NUTRITION THERAPIES  ADULT DIET; Dysphagia - Pureed     PO Intake: Unable to assess   PO Supplement Intake:None Ordered  Additional Sources of Calories/IVF:n/a     ANTHROPOMETRICS  Current Height: 6' 5\" (195.6 cm)  Current Weight: 193 lb (87.5 kg)    Admission weight: 193 lb (87.5 kg)  Ideal Body Weight (IBW): 208 lbs  (95 kg)    Usual Bodyweight    HAILEE  Weight Changes: HAILEE-no recent wt hx       BMI: 22.9    Wt Readings from Last 50 Encounters:   06/01/22 193 lb (87.5 kg)   02/02/18 210 lb 8.6 oz (95.5 kg)     COMPARATIVE STANDARDS  Energy (kcal):  8025-1359 (25-30)     Protein (g):  105-123 (1.2-1.4)       Fluid (ml/day):  5268-8793    The patient will still be monitored per nutrition standards of care. Consult dietitian if nutrition interventions essential to patient care is needed.      Enedina Bishop Vicente 86, [address]  [place]:  [phone]  Office:  [phone]

## 2022-06-03 NOTE — PROCEDURES
INSTRUMENTAL SWALLOW REPORT  MODIFIED BARIUM SWALLOW    NAME: Paul Allen   : 1940  MRN: [id number]       Date of Eval: 6/3/2022     Ordering Physician: Dr. Skylar Sow  Radiologist: Dr. Steve Medina     Referring Diagnosis(es): Referring Diagnosis: Dysphagia    Past Medical History:  has a past medical history of Hyperlipidemia and Hypertension. Past Surgical History:  has no past surgical history on file. Date of Prior Study: NA  Type of Study: Initial MBS    Recent CXR: 2022  Impression       Ill-defined airspace density in the left base, consistent with early infiltrate or atelectasis.       No other acute cardiopulmonary findings. Patient Complaints/Reason for Referral:  Paul Allen was referred for a MBS to assess the efficiency of his/her swallow function, assess for aspiration, and to make recommendations regarding safe dietary consistencies, effective compensatory strategies, and safe eating environment. Patient complaints: Patient did not state; referred d/t concern for aspiration    Onset of problem:   Date of Onset: 2022    General Comment  Comments: Per admitting H&P (2022): 'Pt is a 78 yo M with a PMHx of HLD, HTN , CAD s/p cardiac cath  with stents, GERD, PVD who presents with fatigue and SOB. Patient was recently discharged for PE, UTI and MADDI with hypernatremia and was treated with Ab and discharged to SNF. COPD is suspected as an underlying cause along with PE. The daughter was bed side who was saying the patient never fully recovered and was more altered today. Patient was brought to the hospital for further work up. Patient was a long time smoker who quit only  A few months prior to his admission at [facility] in May 2022. Denies any ETOH or illicit drug abuse. Pt was feeling more SOB but denied any pain, fevers, chills, nausea, vomiting, dysuria or hematuria. Pt has had poor PO intake the past few days. ED coursePatient was seen at bedside.  Was AAOx3 but was difficult to understand. The patient daughter and the patient was spoken to about palliative care and possible hospice. She agreed she would like to speak to someone as she is a nurse. Full code for now. Pt vitals A feb, hr 72, rr 25, O2 95% on 2L. VBG 7.416/68/34. Labs appreciated which showed LA 1.6, K 5.7 which was repeated, Cr 0.9, Pro , Trop 0.09, WBC 3.4, Hg 10.6, , UA showed 3-5 wbc with +2 bacteria no LE. EKG unremarkable. CT head was negative for any acute events. CXR showed infiltration at the left lung base but in my views does not look different from prior cxr. Pt will be admitted for possible HAP PNA. '  Subjective  Subjective: Received pt awake, confused, restless. Requiring cues to participate. Frequently shifting position. Behavior/Cognition/Vision/Hearing:  Behavior/Cognition: Alert;Confused; Distractible;Requires cueing  Vision Exceptions: Wears glasses at all times  Hearing: Exceptions to [facility]  Hearing Exceptions: Hard of hearing/hearing concerns    Impressions:  Oral Phase: Mild-moderate oral dysphagia characterized by prolonged oral prep, slowed A-P propulsion, premature posterior spillage, mild oral stasis. Advanced solid consistency deferred for safety. Pharyngeal phase: Moderate-severe pharyngeal dysphagia characterized by impairments in timing, sensation, hyolaryngeal mechanics, and pharyngeal constriction. Resulted in premature spillage of all liquid boluses to pyriforms, reduced airway protection with deep penetration and silent aspiration of thin liquids by cup/straw, and deep penetration and suspected aspiration nectar thick liquid trials (difficult to assess d/t pt movement). Penetration and aspiration not observed for puree, thins via tsp, or for honey thick via cup; however, for honey thick liquids, significant diffuse pharyngeal residue present. D/t confusion, pt not able to utilize compensatory strategies.     Upper Esophageal Screen: Impaired; reduced UES opening with pooling and reflux of material into pharynx     Swallow safety and efficiency are impaired. Monitor ability to tolerate PO consistencies (may fluctuate with alertness level). Also given modified diet and recommended delivery of liquid (via tsp), will need to closely monitor intake to ensure sufficient nutrition/hydration. May need to consider temporary alternative means nutrition hydration. Dysphagia Outcome Severity Scale: Level 2: Moderate Severe dysphagia- Maximum assistance or maximum use of strategies with partial PO only  Penetration-Aspiration Scale (PAS): 8 - Material Enters the airway, passes below the vocal folds, and no effort is made to eject    Treatment Dx and ICD 10: dysphagia  Patient Position: Lateral and upright/partially reclined (pt shifted position throughout study)      Recommended Diet:  Solid consistency: Pureed  Liquid consistency: Thin  Liquid administration via: Spoon    Medication administration: Meds in puree    Safe Swallow Protocol:  Supervision: 1:1  Compensatory Swallowing Strategies : No straws;Upright as possible for all oral intake;Eat/Feed slowly; Remain upright for 30-45 minutes after meals; Alternate solids and liquids;Small bites/sips;Assist feed; Liquid by spoon only      Recommendations/Treatment  Requires SLP Intervention: Yes  Recommendations: F/U MBS;GI Eval  Recommendations comment: Consider GI evaluation (reduced UES opening, and reflux into pharynx)  D/C Recommendations: Ongoing speech therapy is recommended during this hospitalization  Postural Changes and/or Swallow Maneuvers: Upright 90 degrees      Recommended Exercises:    Therapeutic Interventions: Diet tolerance monitoring;Patient/Family education;Oral care; Laryngeal exercises    Referral To: GI    Education: Images and recommendations were reviewed following this exam.   Patient Education: Educated pt to purpose of visit  Patient Education Response: No evidence of learning    Safety Devices  Safety Devices in place: Yes  Type of devices: All fall risk precautions in place      Goals:   Goal 1: Patient will participate in further assessment of swallow function as appropriate. Goal 2: Patient/caregiver will demonstrate understanding of swallowing concerns/recommendations. Goal 3: Patient will tolerate least restrictive diet without overt signs of aspiration or associated decline respiratory status. Oral Preparation / Oral Phase  Slowed oral prep, reduced a-p transit, premature posterior loss, oral stasis,    Pharyngeal Phase  Swallow delay, impaired sensation, reduced hyolaryngeal mechanics including reduced anterior movement/elevation of larynx, delayed and reduced epiglottic retraction, silent aspiration, penetration    Esophageal Phase  Esophageal Screen: Impaired  Upper Esophageal Screen- Major Contributing Deficits  Major Contributing Deficits: Reduced Cricopharyngeal Opening;Esophageal Backflow into the Pharynx      Pain   Yes, repositioned    Therapy Time:   Individual Concurrent Group Co-treatment   Time In 0924         Time Out 1000         Minutes 36            Timed Code Treatment Minutes: 0 Minutes  Total Treatment Time: 36    Plan  Diet Recommendations:     - thin liquids via TEASPOON ONLY and pureed solids  - 1:1 assist feed  - small bites, slow rate, upright position, fully alert/awake     - oral hygiene 2-3x/day    - consider GI consult for esophageal issues (reduced UES opening, reflux into pharynx)    Discharge Plan:  TBD  Discussed with RN, Baldomero Mak. Needs within reach. Electronically Signed by:  KIM Corea [address]   Speech-Language Pathologist  Pager #[phone]    This document will serve as a discharge summary if pt discharges before next treatment.

## 2022-06-03 NOTE — CARE COORDINATION
CM following. Pt is from [address] [place] [address], but was scheduled to transfer to [address] [place] [address] [address] the day after pt came to the hospital.  Before SNF, pt had been living at home with family, still independent with some ADLs, but gradually detiriorating this past year. Pt has been refusing PT/OT at SNF, and not eating/drinking. Pt started on 2L O2, now on 4L. [facility] is agreeable to LTC with hospice. CM spoke with Chen at [facility] who confirmed that pt can still DC to their LTC. Tay Meehan requested [facility] for hospice, and CM confirmed that [facility] works with [employer]. CM requested hospice order from Attending and sent referral to [employer].         Electronically signed by VICTORINA Garcia, CATIE on 6/3/2022 at 4:48 PM Subjective:       Patient ID: Vincenzo Meier is a 86 y.o. female.    Chief Complaint: Hearing Loss    HPI Comments: Patient is a very pleasant 86 y.o. female here to see me today for the first time for evaluation of hearing loss.  She reports hearing loss that has been gradually progressing over the last 1 years.  She has not noted any difference in hearing between the ears, with either ear being the better hearing ear.  She has noted very mild intermittent tinnitus in both ears.  She has not had any recent issues with ear pain or ear drainage.  She denies a family history of hearing loss, and has not had any previous otologic surgery.  She denies any history of significant loud noise exposure.  She has issues with dizziness at times, intermittent.      Review of Systems   Constitutional: Negative for chills, fatigue, fever and unexpected weight change.   HENT: Positive for hearing loss and tinnitus. Negative for congestion, dental problem, ear discharge, ear pain, facial swelling, nosebleeds, postnasal drip, rhinorrhea, sinus pressure, sneezing, sore throat, trouble swallowing and voice change.    Eyes: Negative for redness, itching and visual disturbance.   Respiratory: Negative for cough, choking, shortness of breath and wheezing.    Cardiovascular: Negative for chest pain and palpitations.   Gastrointestinal: Negative for abdominal pain.        No reflux.   Musculoskeletal: Positive for arthralgias. Negative for gait problem.   Skin: Negative for rash.   Neurological: Negative for dizziness, light-headedness and headaches.       Objective:      Physical Exam   Constitutional: She is oriented to person, place, and time. She appears well-developed and well-nourished. No distress.   HENT:   Head: Normocephalic and atraumatic.   Right Ear: Tympanic membrane, external ear and ear canal normal. Decreased hearing is noted.   Left Ear: Tympanic membrane, external ear and ear canal normal. Decreased hearing is noted.    Nose: Nose normal. No mucosal edema, rhinorrhea, nasal deformity or septal deviation. No epistaxis. Right sinus exhibits no maxillary sinus tenderness and no frontal sinus tenderness. Left sinus exhibits no maxillary sinus tenderness and no frontal sinus tenderness.   Mouth/Throat: Uvula is midline, oropharynx is clear and moist and mucous membranes are normal. Mucous membranes are not pale and not dry. She has dentures. No oropharyngeal exudate or posterior oropharyngeal erythema.   Eyes: Conjunctivae, EOM and lids are normal. Pupils are equal, round, and reactive to light. Right eye exhibits no chemosis. Left eye exhibits no chemosis. Right conjunctiva is not injected. Left conjunctiva is not injected. No scleral icterus. Right eye exhibits normal extraocular motion and no nystagmus. Left eye exhibits normal extraocular motion and no nystagmus.   Neck: Trachea normal and phonation normal. No tracheal tenderness present. No tracheal deviation present. No thyroid mass and no thyromegaly present.   Cardiovascular: Intact distal pulses.    Pulmonary/Chest: Effort normal. No stridor. No respiratory distress.   Abdominal: She exhibits no distension.   Lymphadenopathy:        Head (right side): No submental, no submandibular, no preauricular, no posterior auricular and no occipital adenopathy present.        Head (left side): No submental, no submandibular, no preauricular, no posterior auricular and no occipital adenopathy present.     She has no cervical adenopathy.   Neurological: She is alert and oriented to person, place, and time. No cranial nerve deficit.   Skin: Skin is warm and dry. No rash noted. No erythema.   Psychiatric: She has a normal mood and affect. Her behavior is normal.         Previous audiogram from 2010:          AUDIOGRAM:  Results reveal a mild-to-moderately severe sensorineural hearing loss 500-8000 Hz for the right ear, and mild-to-severe sensorineural hearing loss 500-8000 Hz for the left  ear. Speech Reception Thresholds were 35 dBHL for the right ear and 35 dBHL for the left ear. Word recognition scores were good for the right ear and good for the left ear. Tympanograms were Type A, normal for the right ear and Type A, normal for the left ear.      Assessment:       1. Hearing loss, neural        Plan:       1.  Hearing loss:  We reviewed the patient's recent audiogram and hearing loss in detail.  We also discussed that she is a good candidate for hearing aids, if and when she the patient is motivated.  She was given handouts with information and pricing of hearing aids, and will contact audiology when ready to proceed.  We also discussed the use hearing protection when exposed to loud noise, including lawn equipment.

## 2022-06-04 ENCOUNTER — APPOINTMENT (OUTPATIENT)
Dept: GENERAL RADIOLOGY | Age: 82
DRG: 177 | End: 2022-06-04
Payer: MEDICARE

## 2022-06-04 ENCOUNTER — APPOINTMENT (OUTPATIENT)
Dept: CT IMAGING | Age: 82
DRG: 177 | End: 2022-06-04
Payer: MEDICARE

## 2022-06-04 LAB
ALBUMIN SERPL-MCNC: 2.4 G/DL (ref 3.4–5)
ALP BLD-CCNC: 662 U/L (ref 40–129)
ALT SERPL-CCNC: 68 U/L (ref 10–40)
AMMONIA: 23 UMOL/L (ref 16–60)
ANION GAP SERPL CALCULATED.3IONS-SCNC: 4 MMOL/L (ref 3–16)
AST SERPL-CCNC: 76 U/L (ref 15–37)
BASOPHILS ABSOLUTE: 0 K/UL (ref 0–0.2)
BASOPHILS RELATIVE PERCENT: 0.1 %
BILIRUB SERPL-MCNC: 5.8 MG/DL (ref 0–1)
BILIRUBIN DIRECT: 5.3 MG/DL (ref 0–0.3)
BILIRUBIN, INDIRECT: 0.5 MG/DL (ref 0–1)
BUN BLDV-MCNC: 19 MG/DL (ref 7–20)
CALCIUM SERPL-MCNC: 9 MG/DL (ref 8.3–10.6)
CHLORIDE BLD-SCNC: 105 MMOL/L (ref 99–110)
CO2: 39 MMOL/L (ref 21–32)
CREAT SERPL-MCNC: 1 MG/DL (ref 0.8–1.3)
EOSINOPHILS ABSOLUTE: 0 K/UL (ref 0–0.6)
EOSINOPHILS RELATIVE PERCENT: 0.2 %
GFR AFRICAN AMERICAN: >60
GFR NON-AFRICAN AMERICAN: >60
GLUCOSE BLD-MCNC: 100 MG/DL (ref 70–99)
GLUCOSE BLD-MCNC: 58 MG/DL (ref 70–99)
HCT VFR BLD CALC: 31.2 % (ref 40.5–52.5)
HEMOGLOBIN: 10 G/DL (ref 13.5–17.5)
INR BLD: 1.04 (ref 0.87–1.14)
LYMPHOCYTES ABSOLUTE: 0.4 K/UL (ref 1–5.1)
LYMPHOCYTES RELATIVE PERCENT: 11 %
MAGNESIUM: 2 MG/DL (ref 1.8–2.4)
MCH RBC QN AUTO: 33.2 PG (ref 26–34)
MCHC RBC AUTO-ENTMCNC: 31.9 G/DL (ref 31–36)
MCV RBC AUTO: 104 FL (ref 80–100)
MONOCYTES ABSOLUTE: 0.2 K/UL (ref 0–1.3)
MONOCYTES RELATIVE PERCENT: 5.4 %
NEUTROPHILS ABSOLUTE: 3 K/UL (ref 1.7–7.7)
NEUTROPHILS RELATIVE PERCENT: 83.3 %
PDW BLD-RTO: 15.4 % (ref 12.4–15.4)
PERFORMED ON: ABNORMAL
PLATELET # BLD: 122 K/UL (ref 135–450)
PMV BLD AUTO: 9 FL (ref 5–10.5)
POTASSIUM REFLEX MAGNESIUM: 4.2 MMOL/L (ref 3.5–5.1)
PROTHROMBIN TIME: 13.6 SEC (ref 11.7–14.5)
RBC # BLD: 3 M/UL (ref 4.2–5.9)
SODIUM BLD-SCNC: 148 MMOL/L (ref 136–145)
TOTAL PROTEIN: 5.3 G/DL (ref 6.4–8.2)
WBC # BLD: 3.5 K/UL (ref 4–11)

## 2022-06-04 PROCEDURE — 94640 AIRWAY INHALATION TREATMENT: CPT

## 2022-06-04 PROCEDURE — 80076 HEPATIC FUNCTION PANEL: CPT

## 2022-06-04 PROCEDURE — 83735 ASSAY OF MAGNESIUM: CPT

## 2022-06-04 PROCEDURE — 82140 ASSAY OF AMMONIA: CPT

## 2022-06-04 PROCEDURE — 6360000002 HC RX W HCPCS: Performed by: INTERNAL MEDICINE

## 2022-06-04 PROCEDURE — 2580000003 HC RX 258

## 2022-06-04 PROCEDURE — 80048 BASIC METABOLIC PNL TOTAL CA: CPT

## 2022-06-04 PROCEDURE — 94761 N-INVAS EAR/PLS OXIMETRY MLT: CPT

## 2022-06-04 PROCEDURE — 71045 X-RAY EXAM CHEST 1 VIEW: CPT

## 2022-06-04 PROCEDURE — 6360000004 HC RX CONTRAST MEDICATION: Performed by: NURSE PRACTITIONER

## 2022-06-04 PROCEDURE — 85610 PROTHROMBIN TIME: CPT

## 2022-06-04 PROCEDURE — 6370000000 HC RX 637 (ALT 250 FOR IP): Performed by: STUDENT IN AN ORGANIZED HEALTH CARE EDUCATION/TRAINING PROGRAM

## 2022-06-04 PROCEDURE — 74177 CT ABD & PELVIS W/CONTRAST: CPT

## 2022-06-04 PROCEDURE — 36415 COLL VENOUS BLD VENIPUNCTURE: CPT

## 2022-06-04 PROCEDURE — 6370000000 HC RX 637 (ALT 250 FOR IP): Performed by: INTERNAL MEDICINE

## 2022-06-04 PROCEDURE — 2060000000 HC ICU INTERMEDIATE R&B

## 2022-06-04 PROCEDURE — 85025 COMPLETE CBC W/AUTO DIFF WBC: CPT

## 2022-06-04 RX ORDER — IPRATROPIUM BROMIDE AND ALBUTEROL SULFATE 2.5; .5 MG/3ML; MG/3ML
1 SOLUTION RESPIRATORY (INHALATION) EVERY 4 HOURS PRN
Status: DISCONTINUED | OUTPATIENT
Start: 2022-06-04 | End: 2022-06-08 | Stop reason: HOSPADM

## 2022-06-04 RX ORDER — DEXTROSE MONOHYDRATE 50 MG/ML
100 INJECTION, SOLUTION INTRAVENOUS PRN
Status: DISCONTINUED | OUTPATIENT
Start: 2022-06-04 | End: 2022-06-08 | Stop reason: HOSPADM

## 2022-06-04 RX ORDER — IPRATROPIUM BROMIDE AND ALBUTEROL SULFATE 2.5; .5 MG/3ML; MG/3ML
1 SOLUTION RESPIRATORY (INHALATION) 3 TIMES DAILY
Status: DISCONTINUED | OUTPATIENT
Start: 2022-06-04 | End: 2022-06-08

## 2022-06-04 RX ADMIN — SODIUM CHLORIDE, PRESERVATIVE FREE 10 ML: 5 INJECTION INTRAVENOUS at 08:21

## 2022-06-04 RX ADMIN — ENOXAPARIN SODIUM 90 MG: 100 INJECTION SUBCUTANEOUS at 21:26

## 2022-06-04 RX ADMIN — IPRATROPIUM BROMIDE AND ALBUTEROL SULFATE 1 AMPULE: 2.5; .5 SOLUTION RESPIRATORY (INHALATION) at 21:14

## 2022-06-04 RX ADMIN — SODIUM CHLORIDE, PRESERVATIVE FREE 10 ML: 5 INJECTION INTRAVENOUS at 20:12

## 2022-06-04 RX ADMIN — THIAMINE HYDROCHLORIDE 100 MG: 100 INJECTION, SOLUTION INTRAMUSCULAR; INTRAVENOUS at 08:31

## 2022-06-04 RX ADMIN — IPRATROPIUM BROMIDE AND ALBUTEROL SULFATE 1 AMPULE: 2.5; .5 SOLUTION RESPIRATORY (INHALATION) at 15:23

## 2022-06-04 RX ADMIN — ENOXAPARIN SODIUM 90 MG: 100 INJECTION SUBCUTANEOUS at 08:21

## 2022-06-04 RX ADMIN — IOPAMIDOL 75 ML: 755 INJECTION, SOLUTION INTRAVENOUS at 22:55

## 2022-06-04 ASSESSMENT — PAIN SCALES - WONG BAKER

## 2022-06-04 NOTE — PROGRESS NOTES
Progress Note    Admit Date: 6/1/2022  Diet: ADULT DIET; Dysphagia - Pureed  ADULT ORAL NUTRITION SUPPLEMENT; Breakfast, Lunch, Dinner; Fortified Pudding Oral Supplement  ADULT ORAL NUTRITION SUPPLEMENT; Breakfast, Lunch, Dinner; Frozen Oral Supplement  ADULT ORAL NUTRITION SUPPLEMENT; Dinner; Standard High Calorie/High Protein Oral Supplement    CC: AMS    Interval history: pt seen and examined at bedside. Vitals stable    Medications:     Scheduled Meds:   sodium chloride flush  5-40 mL IntraVENous 2 times per day    [Held by provider] aspirin  325 mg Oral Daily    [Held by provider] metoprolol succinate  12.5 mg Oral Daily    [Held by provider] atorvastatin  10 mg Oral Daily    [Held by provider] tamsulosin  0.4 mg Oral Daily    [Held by provider] lisinopril  40 mg Oral Daily    thiamine  100 mg IntraVENous Daily    enoxaparin  1 mg/kg SubCUTAneous BID    [Held by provider] melatonin  10 mg Oral Nightly     Continuous Infusions:   dextrose      sodium chloride 50 mL/hr at 06/03/22 1801    sodium chloride       PRN Meds:glucose, dextrose bolus **OR** dextrose bolus, glucagon (rDNA), dextrose, sodium chloride flush, sodium chloride, ondansetron **OR** ondansetron, polyethylene glycol, acetaminophen **OR** acetaminophen, hydrALAZINE    Objective:   Vitals:   T-max:  Patient Vitals for the past 8 hrs:   BP Temp Temp src Pulse Resp SpO2   06/04/22 0817 (!) 148/79 97.8 °F (36.6 °C) Oral 73 22 100 %   06/04/22 0430 (!) 157/75 98 °F (36.7 °C) Oral 69 18 98 %   06/04/22 0045 128/83 97.9 °F (36.6 °C) Oral 69 20 98 %       Intake/Output Summary (Last 24 hours) at 6/4/2022 0820  Last data filed at 6/3/2022 1433  Gross per 24 hour   Intake 60 ml   Output --   Net 60 ml     Physical exam    Constitutional:       Appearance: Normal appearance. HENT:      Head: Normocephalic and atraumatic. Mouth/Throat:      Mouth: Mucous membranes are dry. Eyes:      Extraocular Movements: Extraocular movements intact. Conjunctiva/sclera: Conjunctivae normal.   Cardiovascular:      Rate and Rhythm: Normal rate and regular rhythm. Pulses: Normal pulses. Abdominal:      General: Bowel sounds are normal.      Palpations: Abdomen is soft. Musculoskeletal:         General: Normal range of motion. Cervical back: Normal range of motion and neck supple. Right lower leg: Edema present. Left lower leg: Edema present. Skin:     General: Skin is warm. Neurological:      Mental Status: He is alert. LABS:    CBC:   Recent Labs     06/02/22 0942 06/03/22  0758 06/04/22  0605   WBC 3.3* 4.0 3.5*   HGB 10.5* 11.0* 10.0*   HCT 33.2* 34.7* 31.2*   * 123* 122*   .8* 104.6* 104.0*     Renal:    Recent Labs     06/01/22 1946 06/01/22 2256 06/03/22 0758 06/04/22  0605     --  147* 148*   K 5.7* 3.7 4.2 4.2     --  103 105   CO2 35*  --  36* 39*   BUN 20  --  17 19   CREATININE 0.9  --  0.9 1.0   GLUCOSE 123*  --  66* 58*   CALCIUM 8.9  --  9.2 9.0   MG  --   --  2.00 2.00   ANIONGAP 7  --  8 4     Hepatic: No results for input(s): AST, ALT, BILITOT, BILIDIR, PROT, LABALBU, ALKPHOS in the last 72 hours. Troponin:   Recent Labs     06/01/22 1946 06/02/22  0942 06/02/22  1541   TROPONINI 0.09* 0.10* 0.08*     BNP: No results for input(s): BNP in the last 72 hours. Lipids: No results for input(s): CHOL, HDL in the last 72 hours. Invalid input(s): LDLCALCU, TRIGLYCERIDE  ABGs:  No results for input(s): PHART, WOK6HEK, PO2ART, DRX9HGR, BEART, THGBART, B6GNRSXJ, SSL7SVU in the last 72 hours. INR: No results for input(s): INR in the last 72 hours. Lactate: No results for input(s): LACTATE in the last 72 hours. Cultures:  -----------------------------------------------------------------  RAD:   CTA HEAD NECK W CONTRAST   Final Result      1. No arterial steno-occlusive disease. 2.  Bilateral pleural effusions. FL MODIFIED BARIUM SWALLOW W VIDEO   Final Result      1.  Deep laryngeal penetration with thin liquid consistency by straw and cup and with nectar consistency. CT HEAD WO CONTRAST   Final Result      Cerebral atrophy. No acute intracranial findings. XR CHEST PORTABLE   Final Result      Ill-defined airspace density in the left base, consistent with early infiltrate or atelectasis. No other acute cardiopulmonary findings. Assessment/Plan:     Pt is a 78 yo M with a PMHx of HLD, HTN , CAD s/p cardiac cath 2012 with stents, GERD, PVD who presents with fatigue and SOB.     Possible Aspiration event  Patient O2 requirements have increased while at the SNF according to the daughter, however patient is at baseline O2 since being discharged at [facility] after a PE. CXR showed left sided infilatrates as read by radiologist but unclear to myself as it looks similar to previous CXR. Does not look infectious to me or an acute issue. Issues seem more chronic. No Leukocytosis or recorded fevers. VBG 7.41/68/30      - Procal 0.24   - ESR elevated, CRP elevated    - s/p Cefepime and Vanc ( 6/1 - 6/3)  - MRSA swab sent    - Blood cultures x2 NGTD    - IV fluids 100/h NS   - MBS: revealing laryngeal penetration         Failure to thrive   Severe calorie restriction  - Dietary consulted- Patient has not been eating or drinking recently   - SLP recs: keep pt NPO, dysphagia treatment, oral hygine, MBS today; consider alternative modes for nutrition temporarily   - consulted palliative care, pts POA wants full code for now   - consulted GI following MBS revealing aspiration risk. Pt will likely need NGT/PEG if pt not hospice   - MBS: revealing laryngeal penetration   - continuous IVF @ 50 ml/hr       Hx COPD   Hx PE   - Eliquis BID from PE in May 2022 changed to lovenox BID       NSTEMI Type 2   -Trop elevated - will trend. Likely demand ischemia from poor PO intake.  Echo showed 60-65% with grade 1 diastolic.       Venous stasis ulcers b/l LE   -Podiatry consulted   - wound care consulted      BPH  - Continue with flomax      HLD  -holding Lipitor 10mg      HTN   -Continue with Lopressor 12.5mg Daily ( was on 25mg but was changed on DC from [employer])   -Continue lisinopril 40mg and Hydrochlorothiazide      CAD  -Was on ASA 324mg but now hes off since hes been on Eliquis      MCV  -Folate and B12, increased   - started on thiamine       Code Status: full code  FEN: npo   PPX: lovenox  DISPO:     Liza Martinez MD, PGY-1  06/04/22  8:20 AM    This patient has been staffed and discussed with Newton Buckner MD.

## 2022-06-04 NOTE — PROGRESS NOTES
Physician Progress Note      Irish White  [facility] #:                  [id number]  :                       1940  ADMIT DATE:       2022 6:49 PM  [address] [place] DATE:  RESPONDING  PROVIDER #:        Cosme Garcia MD          QUERY TEXT:    Patient admitted with pneumonia. Noted documentation of type II NSTEMI in   H&P. In order to support the diagnosis of type II NSTEMI, please refer to 4th   universal definition of MI below and include additional clinical indicators in   your documentation. ? Or please document if the diagnosis of type II NSTEMI   has been ruled out after study. The medical record reflects the following:  ?? Risk Factors: 80 yo w/ pneumonia  ? ? Clinical Indicators: Per H&P:  NSTEMI Type 2. Negative for chest pain. Per   ED:  EKG showed the sinus with PACs. Troponin 0.09 - 0.10 - 0.08.  ?? Treatment: EKG, trend troponins,    Fourth Universal Definition of Myocardial Infarction:  Clearly separates MI   from myocardial injury. Patients with elevated blood troponin levels but   without clinical evidence of ischemia are said to have had a myocardial   injury. ? To have a myocardial infarction requires both an elevated troponin   blood test along with at least one of the following:  - Symptoms of acute myocardial ischemia (Types 1 - 5 MI)  - Clinical evidence of ischemia, as evidenced in an electrocardiogram (EKG)   showing new ischemic changes (Type 1, Type 2, Type 3, or Type 4a MI)  - Development of pathological Q waves (Types 1 - 5 MI)  - Imaging evidence of new loss of viable myocardium or new regional wall   motion abnormality in a pattern consistent with an ischemic etiology (Types 1   - 5 MI)  Options provided:  -- NSTEMI type II ruled out after study and demand ischemia confirmed  -- NSTEMI type II  present as evidenced by, Please document indicators of   myocardial infarction.   -- Other - I will add my own diagnosis  -- Disagree - Not applicable / Not valid  -- Disagree - Clinically unable to determine / Unknown  -- Refer to Clinical Documentation Reviewer    PROVIDER RESPONSE TEXT:    Type II NSTEMI was ruled out after study and demand ischemia confirmed. Query created by: Aishwarya Urban on 6/3/2022 11:51 AM      QUERY TEXT:    Pt admitted with pneumonia. Pt noted to use home 02. If possible, please   document in the progress notes and discharge summary if you are evaluating   and/or treating any of the following: The medical record reflects the following:  Risk Factors: 78 yo former smoker, hx of copd w/ pneumonia  Clinical Indicators: Per Care coordination 6/2: Has HOME OXYGEN prior to   admission: Yes. Per PN 6/2: patient is at baseline O2 since being discharged   at [facility]. Sats 92 - 100%. Treatment: 2-4L 02. Options provided:  -- Chronic respiratory failure  -- Other - I will add my own diagnosis  -- Disagree - Not applicable / Not valid  -- Disagree - Clinically unable to determine / Unknown  -- Refer to Clinical Documentation Reviewer    PROVIDER RESPONSE TEXT:    Provider disagreed with this query.     Query created by: Aishwarya Urban on 6/3/2022 11:58 AM      Electronically signed by:  Radha Lee MD 6/4/2022 10:44 AM

## 2022-06-04 NOTE — SIGNIFICANT EVENT
Rapid response called approximately 4:00 PM.  On arrival patient tired but responsive. Opening eyes to voice. Per bedside RN patient seen mental status waxes and wanes. Patient had just finished lunch and there was concern for aspiration. Per SLP eval yesterday patient was aspiration risk with some consistencies. Rhonchorous on exam.  Slightly increased oxygen requirement to 6L NC. This was titrated down to 2L NC (patient's baseline), patient tolerated well, SP O2 remained 98 to 100%. Chest x-ray ordered. Patient made NPO.     ICU team will follow up peripherally

## 2022-06-04 NOTE — CONSULTS
[address] [place] [address]  GI Consultation                                                                 Patient: Hugh Mccollum  : 1940       Date:  2022    Subjective:       History of Present Illness  Patient is a 79 y.o.  male admitted with Altered mental status, unspecified altered mental status type [R41.82]  Hospital-acquired bacterial pneumonia [J15.9]  Pneumonia of lower lobe due to infectious organism, unspecified laterality [J18.9] who is seen in consult for dysphagia. Unable to obtain history from patient. Nursing reports he is pocketing food then chokes/coughs if tries to swallow. PO intake has been low for some time. He denies abd pain N/V, heartburn. MBS with significant penetration. MRI 2022 IMPRESSION done at [facility]      Pancreatic and biliary ductal dilation with transition at the ampulla.  No discrete mass identified.  Occult neoplasm or stricture are considerations.  Recommend correlation with ERCP.     4.5 cm partially exophytic hepatic mass of the left lateral segment superiorly.  Imaging features suggest cavernous hemangioma with differential of other neoplasm. Abnormal fluid signal within gallbladder without discrete gallstones, possibly sludge. Multiple complex bilateral renal cysts. Trace ascites  Specimen Collected: 22 11:03 AM Last Resulted: 22 11:26 AM   Received From: [facility]          Past Medical History:   Diagnosis Date    Hyperlipidemia     Hypertension       History reviewed. No pertinent surgical history. Admission Meds  No current facility-administered medications on file prior to encounter.      Current Outpatient Medications on File Prior to Encounter   Medication Sig Dispense Refill    mineral oil-hydrophilic petrolatum (AQUAPHOR) ointment Apply topically at bedtime Apply to B/L LE nightly with wound care      bisacodyl (DULCOLAX) 10 MG suppository Place 10 mg rectally daily as needed for Constipation  apixaban (ELIQUIS) 5 MG TABS tablet Take 5 mg by mouth 2 times daily      Sodium Phosphates (FLEET) 7-19 GM/118ML Place 1 enema rectally daily as needed (for constipation if dulcolax suppository is ineffective)      ipratropium-albuterol (DUONEB) 0.5-2.5 (3) MG/3ML SOLN nebulizer solution Inhale 1 vial into the lungs every 4 hours as needed for Shortness of Breath      magnesium hydroxide (MILK OF MAGNESIA) 400 MG/5ML suspension Take 30 mLs by mouth daily as needed for Constipation      acetaminophen (TYLENOL) 325 mg tablet Take 650 mg by mouth every 4 hours as needed for Pain      metoprolol succinate (TOPROL XL) 25 MG extended release tablet Take 25 mg by mouth daily       lisinopril (PRINIVIL;ZESTRIL) 40 MG tablet Take 40 mg by mouth daily      simvastatin (ZOCOR) 20 MG tablet Take 20 mg by mouth nightly      tamsulosin (FLOMAX) 0.4 MG capsule Take 0.4 mg by mouth daily      multivitamin-iron-minerals-folic acid (CENTRUM) chewable tablet Take 1 tablet by mouth daily      Omega-3 Fatty Acids (FISH OIL) 1000 MG CAPS Take 1,000 mg by mouth daily            Allergies  Allergies   Allergen Reactions    Penicillins Rash      Social   Social History     Tobacco Use    Smoking status: Former Smoker     Packs/day: 1.00     Years: 70.00     Pack years: 70.00     Types: Cigarettes     Quit date: 2022     Years since quittin.0    Smokeless tobacco: Never Used   Substance Use Topics    Alcohol use: No        FH not available    Review of Systems  Review of systems not obtained due to patient factors.        Physical Exam    BP (!) 157/75   Pulse 69   Temp 98 °F (36.7 °C) (Oral)   Resp 18   Ht 6' 5\" (1.956 m)   Wt 193 lb (87.5 kg)   SpO2 98%   BMI 22.89 kg/m²   General appearance: alert, cooperative, no distress, appears stated age  Anicteric, No Jaundice  Head: Normocephalic, without obvious abnormality  Lungs: clear to auscultation bilaterally, Normal Effort  Heart: regular rate and rhythm, normal S1 and S2, no murmurs or rubs  Abdomen: soft, non-tender; bowel sounds normal; no masses,  no organomegaly  Extremities: atraumatic, no cyanosis or edema  Skin: warm and dry  Neuro: unable to examine  Alert oriented to person      Data Review:    Recent Labs     06/02/22  0942 06/03/22  0758 06/04/22  0605   WBC 3.3* 4.0 3.5*   HGB 10.5* 11.0* 10.0*   HCT 33.2* 34.7* 31.2*   .8* 104.6* 104.0*   * 123* 122*     Recent Labs     06/01/22  1946 06/01/22  2256 06/03/22  0758 06/04/22  0605     --  147* 148*   K 5.7* 3.7 4.2 4.2     --  103 105   CO2 35*  --  36* 39*   BUN 20  --  17 19   CREATININE 0.9  --  0.9 1.0     Imaging Studies:   MODIFIED BARIUM SWALLOW.       INDICATION: Dysphagia.       COMPARISON: None.       FLUOROSCOPY TIME: 2.3 minutes       IMAGES: 16       FINDINGS:        In conjunction with speech pathology, multiple swallows of different consistencies of barium were performed and viewed in real-time lateral fluoroscopic projection.       With thin liquid consistency by spoon no definite laryngeal penetration. With thin liquid consistency by straw and by cup, deep laryngeal penetration. With nectar consistency by straw deep laryngeal penetration. No penetration or aspiration with honey consistency by cup.           Impression       1. Deep laryngeal penetration with thin liquid consistency by straw and cup and with nectar consistency. Assessment:     Principal Problem:    Hospital-acquired bacterial pneumonia  Active Problems:    FTT (failure to thrive) in adult  Resolved Problems:    * No resolved hospital problems. *  Probable aspiration   Dilated pancreatic duct on [facility] at [facility] 4/2022  Malnutrition  Recent PE    Apparently patient not a candidate for esophagram due to aspiration risk. He could be having poor PO intake due to dementia, occult neoplasm, esophageal issue/stricture, dysmotility etc. Unable to currently reach daughter.   Options are consider EGD, possible PEG, temporary NG feeding, hospice, need input from family. Multiple significant medical issues and advanced age    Recommendations:     Await family decision on above issues to determine further work up    Thank you for the opportunity to participate in [address].     Thanh Roberts MD

## 2022-06-04 NOTE — PLAN OF CARE
Problem: Skin/Tissue Integrity  Goal: Absence of new skin breakdown  Description: 1. Monitor for areas of redness and/or skin breakdown  2. Assess vascular access sites hourly  Outcome: Progressing  Note: On 2 hourly turning, protective cream applied. Problem: Safety - Adult  Goal: Free from fall injury  Outcome: Progressing  Note: Call lights within reach, fall precautions in place at all times.

## 2022-06-04 NOTE — PROGRESS NOTES
Responded to rapid response, no BLS/CPR performed. Orally suctioned for no secretions. Found pt on 6lpm w/ SP02 100%. Decreased to 2lpm, SP02 100%. No respiratory distress noted.

## 2022-06-04 NOTE — PROGRESS NOTES
Discussed with daughter who is a nurse. Since he was fairly functional a week ago would like to pursue EGD and PEG on Monday if stable. Looks like his O2 requirement is decreasing today.  Discussed risk alternatives potential complications including resp issues requiring intubation, bleeding infections perforation with Maggie Song and she consents to proceed  Also she reports he had an esophageal dilation in the remote past

## 2022-06-05 LAB
ANION GAP SERPL CALCULATED.3IONS-SCNC: 8 MMOL/L (ref 3–16)
BASOPHILS ABSOLUTE: 0 K/UL (ref 0–0.2)
BASOPHILS RELATIVE PERCENT: 0.1 %
BLOOD CULTURE, ROUTINE: NORMAL
BUN BLDV-MCNC: 20 MG/DL (ref 7–20)
CALCIUM SERPL-MCNC: 8.9 MG/DL (ref 8.3–10.6)
CHLORIDE BLD-SCNC: 106 MMOL/L (ref 99–110)
CO2: 34 MMOL/L (ref 21–32)
CREAT SERPL-MCNC: 1.1 MG/DL (ref 0.8–1.3)
CULTURE, BLOOD 2: NORMAL
EOSINOPHILS ABSOLUTE: 0 K/UL (ref 0–0.6)
EOSINOPHILS RELATIVE PERCENT: 0 %
GFR AFRICAN AMERICAN: >60
GFR NON-AFRICAN AMERICAN: >60
GLUCOSE BLD-MCNC: 111 MG/DL (ref 70–99)
HCT VFR BLD CALC: 30.8 % (ref 40.5–52.5)
HEMOGLOBIN: 9.9 G/DL (ref 13.5–17.5)
INR BLD: 1.13 (ref 0.87–1.14)
LYMPHOCYTES ABSOLUTE: 0.5 K/UL (ref 1–5.1)
LYMPHOCYTES RELATIVE PERCENT: 6.8 %
MAGNESIUM: 2 MG/DL (ref 1.8–2.4)
MCH RBC QN AUTO: 33 PG (ref 26–34)
MCHC RBC AUTO-ENTMCNC: 32.1 G/DL (ref 31–36)
MCV RBC AUTO: 102.8 FL (ref 80–100)
MONOCYTES ABSOLUTE: 0.4 K/UL (ref 0–1.3)
MONOCYTES RELATIVE PERCENT: 5.4 %
NEUTROPHILS ABSOLUTE: 6 K/UL (ref 1.7–7.7)
NEUTROPHILS RELATIVE PERCENT: 87.7 %
PDW BLD-RTO: 15.8 % (ref 12.4–15.4)
PLATELET # BLD: 117 K/UL (ref 135–450)
PMV BLD AUTO: 9.2 FL (ref 5–10.5)
POTASSIUM REFLEX MAGNESIUM: 3.7 MMOL/L (ref 3.5–5.1)
PROTHROMBIN TIME: 14.4 SEC (ref 11.7–14.5)
RBC # BLD: 2.99 M/UL (ref 4.2–5.9)
SODIUM BLD-SCNC: 148 MMOL/L (ref 136–145)
WBC # BLD: 6.9 K/UL (ref 4–11)

## 2022-06-05 PROCEDURE — 2580000003 HC RX 258: Performed by: INTERNAL MEDICINE

## 2022-06-05 PROCEDURE — 6360000002 HC RX W HCPCS: Performed by: STUDENT IN AN ORGANIZED HEALTH CARE EDUCATION/TRAINING PROGRAM

## 2022-06-05 PROCEDURE — 36415 COLL VENOUS BLD VENIPUNCTURE: CPT

## 2022-06-05 PROCEDURE — 83735 ASSAY OF MAGNESIUM: CPT

## 2022-06-05 PROCEDURE — 80048 BASIC METABOLIC PNL TOTAL CA: CPT

## 2022-06-05 PROCEDURE — P9047 ALBUMIN (HUMAN), 25%, 50ML: HCPCS | Performed by: STUDENT IN AN ORGANIZED HEALTH CARE EDUCATION/TRAINING PROGRAM

## 2022-06-05 PROCEDURE — 2580000003 HC RX 258

## 2022-06-05 PROCEDURE — 94640 AIRWAY INHALATION TREATMENT: CPT

## 2022-06-05 PROCEDURE — 85610 PROTHROMBIN TIME: CPT

## 2022-06-05 PROCEDURE — 85025 COMPLETE CBC W/AUTO DIFF WBC: CPT

## 2022-06-05 PROCEDURE — 2700000000 HC OXYGEN THERAPY PER DAY

## 2022-06-05 PROCEDURE — 2500000003 HC RX 250 WO HCPCS: Performed by: INTERNAL MEDICINE

## 2022-06-05 PROCEDURE — 6370000000 HC RX 637 (ALT 250 FOR IP): Performed by: INTERNAL MEDICINE

## 2022-06-05 PROCEDURE — 6360000002 HC RX W HCPCS: Performed by: INTERNAL MEDICINE

## 2022-06-05 PROCEDURE — 2060000000 HC ICU INTERMEDIATE R&B

## 2022-06-05 PROCEDURE — 94761 N-INVAS EAR/PLS OXIMETRY MLT: CPT

## 2022-06-05 RX ORDER — ALBUMIN (HUMAN) 12.5 G/50ML
50 SOLUTION INTRAVENOUS ONCE
Status: COMPLETED | OUTPATIENT
Start: 2022-06-05 | End: 2022-06-05

## 2022-06-05 RX ORDER — OLANZAPINE 10 MG/1
5 INJECTION, POWDER, LYOPHILIZED, FOR SOLUTION INTRAMUSCULAR
Status: COMPLETED | OUTPATIENT
Start: 2022-06-05 | End: 2022-06-05

## 2022-06-05 RX ORDER — FUROSEMIDE 10 MG/ML
40 INJECTION INTRAMUSCULAR; INTRAVENOUS ONCE
Status: COMPLETED | OUTPATIENT
Start: 2022-06-05 | End: 2022-06-05

## 2022-06-05 RX ADMIN — ALBUMIN (HUMAN) 50 G: 0.25 INJECTION, SOLUTION INTRAVENOUS at 12:54

## 2022-06-05 RX ADMIN — IPRATROPIUM BROMIDE AND ALBUTEROL SULFATE 1 AMPULE: 2.5; .5 SOLUTION RESPIRATORY (INHALATION) at 08:40

## 2022-06-05 RX ADMIN — IPRATROPIUM BROMIDE AND ALBUTEROL SULFATE 1 AMPULE: 2.5; .5 SOLUTION RESPIRATORY (INHALATION) at 21:36

## 2022-06-05 RX ADMIN — ENOXAPARIN SODIUM 90 MG: 100 INJECTION SUBCUTANEOUS at 10:15

## 2022-06-05 RX ADMIN — SODIUM CHLORIDE 3000 MG: 900 INJECTION INTRAVENOUS at 16:59

## 2022-06-05 RX ADMIN — OLANZAPINE 5 MG: 10 INJECTION, POWDER, FOR SOLUTION INTRAMUSCULAR at 16:58

## 2022-06-05 RX ADMIN — SODIUM CHLORIDE, PRESERVATIVE FREE 10 ML: 5 INJECTION INTRAVENOUS at 19:37

## 2022-06-05 RX ADMIN — SODIUM CHLORIDE 3000 MG: 900 INJECTION INTRAVENOUS at 00:14

## 2022-06-05 RX ADMIN — ENOXAPARIN SODIUM 90 MG: 100 INJECTION SUBCUTANEOUS at 19:37

## 2022-06-05 RX ADMIN — SODIUM CHLORIDE 3000 MG: 900 INJECTION INTRAVENOUS at 22:57

## 2022-06-05 RX ADMIN — THIAMINE HYDROCHLORIDE 100 MG: 100 INJECTION, SOLUTION INTRAMUSCULAR; INTRAVENOUS at 10:15

## 2022-06-05 RX ADMIN — SODIUM CHLORIDE: 9 INJECTION, SOLUTION INTRAVENOUS at 22:56

## 2022-06-05 RX ADMIN — SODIUM CHLORIDE 3000 MG: 900 INJECTION INTRAVENOUS at 10:15

## 2022-06-05 RX ADMIN — FUROSEMIDE 40 MG: 10 INJECTION, SOLUTION INTRAMUSCULAR; INTRAVENOUS at 12:54

## 2022-06-05 RX ADMIN — SODIUM CHLORIDE: 9 INJECTION, SOLUTION INTRAVENOUS at 10:14

## 2022-06-05 RX ADMIN — IPRATROPIUM BROMIDE AND ALBUTEROL SULFATE 1 AMPULE: 2.5; .5 SOLUTION RESPIRATORY (INHALATION) at 15:10

## 2022-06-05 RX ADMIN — SODIUM CHLORIDE 3000 MG: 900 INJECTION INTRAVENOUS at 04:01

## 2022-06-05 ASSESSMENT — PAIN SCALES - WONG BAKER
WONGBAKER_NUMERICALRESPONSE: 0

## 2022-06-05 ASSESSMENT — PAIN SCALES - GENERAL
PAINLEVEL_OUTOF10: 0

## 2022-06-05 NOTE — PROGRESS NOTES
Speech Language Pathology  Dysphagia treat - on hold per nursing      Chart reviewed. D/W nursing, Malissa Saha, who indicated to hold off session today d/t ascites (abdominal fluid). Pt had rapid response yesterday and increased 02 requirement and made NPO. GI doctor note from today indicated \"PEG on hold due to extensive ascites and deterioration of resp status, Consider Duotube for feeding\". Will  f/u tomorrow as appropriate.     Ralph Watson MA CCC/SLP [id number]  Speech Language Pathologist  Pager [phone]

## 2022-06-05 NOTE — PROGRESS NOTES
Pt did desaturate overnight into the lower 80's on 3L O2, even after being repositioned upright in bed. O2 flow rate increased to 4Lpm.  Pt now with O2 saturation in mid 90's. Pt deep-suctioned at the beginning of the evening (which produced some bloody sputum), but since has been able to cough productively, especially when turned side to side to change diaper. Breath sounds still slightly rhonchorous, but better than last evening.

## 2022-06-05 NOTE — PROGRESS NOTES
.6* 104.0* 102.8*   * 122* 117*     Recent Labs     06/03/22  0758 06/04/22  0605 06/05/22  0548   * 148* 148*   K 4.2 4.2 3.7    105 106   CO2 36* 39* 34*   BUN 17 19 20   CREATININE 0.9 1.0 1.1     Recent Labs     06/04/22  0941   AST 76*   ALT 68*   BILIDIR 5.3*   BILITOT 5.8*   ALKPHOS 662*     Recent Labs     06/04/22  0941   PROT 5.3*   INR 1.04     Radiology review:   CT ABDOMEN AND PELVIS WITH CONTRAST       HISTORY: Elevated liver chemistry       COMPARISON: CT chest from 9/23/2011       TECHNICAL FACTORS:       Post contrast axial images were obtained through the abdomen and pelvis. 100 cc of Isovue 370 was administered. Underexposure controls were utilized during the CT examination to meet ALARA standards for radiation dose reduction.       FINDINGS:       There are bilateral pleural effusions with bibasilar atelectasis versus airspace disease.       Large amount of ascites is present.       There is diffuse intrahepatic biliary ductal dilatation.       Increased density seen in the gallbladder lumen which may indicate sludge.       Spleen is within normal limits in size.       Pancreas shows no gross inflammatory process or fluid collection.       Adrenal glands are within normal limits.       Multiple renal cysts are present.  These are likewise seen on prior study but increased since that time.       No retroperitoneal adenopathy is seen.       Bowel gas pattern is nonspecific with no free air seen.       Appendix is not clearly seen but no gross pericecal inflammatory changes are seen.       No acute osseous abnormality is identified.       Large amount of free fluid is also noted in the pelvis.       Evaluation of the lower pelvis is limited due to streak artifact from bilateral hip replacement surgery.                   Impression       Bilateral pleural effusions with bibasilar airspace disease.       Extensive ascites.       Diffuse intra and extra hepatic biliary duct dilatation may indicate biliary ductal obstruction.       Sludge seen within gallbladder lumen.       Multiple renal cysts which have increased in size and number since prior study.         Assessment:       Principal Problem:    Hospital-acquired bacterial pneumonia  Active Problems:    FTT (failure to thrive) in adult  Resolved Problems:    * No resolved hospital problems.  *    Had a rapid response yesterday and increased O2 requirement     The ascites is much increased and liver chem much more elevated than in April ([facility])  suggesting progressive biliary obstruction, in this age group neoplasm would be higher likelihood but not proven at this point    Extensive ascites is a relative contraindication to PEG placement, this could be malignant or from portal hypertension    Recommendations:       PEG on hold due to extensive ascites and deterioration of resp status, Consider Duotube for feeding  Given ascites and progressive biliary obstruction reconsider goals of care and how aggressive to work up (I called daughter but unable to connect, would re eval with Palliative Care)  If aggessive work up to be pursued would get MRI/MRCP, paracentesis for the usual studies including cytology  Dr Quezada Corporal to take over GI care tomorrow    Carmencita Matos MD  6/5/2022  7:40 AM

## 2022-06-05 NOTE — PROGRESS NOTES
Internal Medicine Progress Note    Admit Date: 6/1/2022    Diet: Diet NPO    CC:AMS    Interval history: NO acute evens reported overnight. Had aspiration event yesterday. Remains on Unasyn. Has remained afebrile. Aspiration precautions, remains NPO       Medications:     Scheduled Meds:   furosemide  40 mg IntraVENous Once    albumin human  50 g IntraVENous Once    ipratropium-albuterol  1 ampule Inhalation TID    ampicillin-sulbactam  3,000 mg IntraVENous Q6H    sodium chloride flush  5-40 mL IntraVENous 2 times per day    [Held by provider] aspirin  325 mg Oral Daily    [Held by provider] metoprolol succinate  12.5 mg Oral Daily    [Held by provider] atorvastatin  10 mg Oral Daily    [Held by provider] tamsulosin  0.4 mg Oral Daily    [Held by provider] lisinopril  40 mg Oral Daily    thiamine  100 mg IntraVENous Daily    enoxaparin  1 mg/kg SubCUTAneous BID    [Held by provider] melatonin  10 mg Oral Nightly     Continuous Infusions:   dextrose      sodium chloride 50 mL/hr at 06/05/22 1014    sodium chloride       PRN Meds:glucose, dextrose bolus **OR** dextrose bolus, glucagon (rDNA), dextrose, ipratropium-albuterol, sodium chloride flush, sodium chloride, ondansetron **OR** ondansetron, polyethylene glycol, acetaminophen **OR** acetaminophen, hydrALAZINE    Objective:   Vitals:   T-max:  Patient Vitals for the past 8 hrs:   BP Temp Temp src Pulse Resp SpO2   06/05/22 0957 132/68 97.6 °F (36.4 °C) Oral 86 24 94 %   06/05/22 0840 -- -- -- -- -- 96 %     No intake or output data in the 24 hours ending 06/05/22 1247    Physical Exam  Constitutional:       General: He is not in acute distress. Comments: Somnolent    Cardiovascular:      Rate and Rhythm: Normal rate and regular rhythm. Pulmonary:      Comments: Coarse breath sounds bilateral   Abdominal:      General: Abdomen is flat. Bowel sounds are normal. There is distension. Palpations: Abdomen is soft.    Musculoskeletal: Right lower leg: No edema. Left lower leg: No edema. Skin:     General: Skin is warm. Neurological:      Comments: Unable to assess   Psychiatric:      Comments: Unable to assess            LABS:    CBC:   Recent Labs     06/03/22 0758 06/04/22 0605 06/05/22  0548   WBC 4.0 3.5* 6.9   HGB 11.0* 10.0* 9.9*   HCT 34.7* 31.2* 30.8*   * 122* 117*   .6* 104.0* 102.8*     Renal:    Recent Labs     06/03/22 0758 06/04/22 0605 06/05/22  0548   * 148* 148*   K 4.2 4.2 3.7    105 106   CO2 36* 39* 34*   BUN 17 19 20   CREATININE 0.9 1.0 1.1   GLUCOSE 66* 58* 111*   CALCIUM 9.2 9.0 8.9   MG 2.00 2.00 2.00   ANIONGAP 8 4 8     Hepatic:   Recent Labs     06/04/22  0941   AST 76*   ALT 68*   BILITOT 5.8*   BILIDIR 5.3*   PROT 5.3*   LABALBU 2.4*   ALKPHOS 662*     Troponin:   Recent Labs     06/02/22  1541   TROPONINI 0.08*     BNP: No results for input(s): BNP in the last 72 hours. Lipids: No results for input(s): CHOL, HDL in the last 72 hours. Invalid input(s): LDLCALCU, TRIGLYCERIDE  ABGs:  No results for input(s): PHART, EYH2PLK, PO2ART, RWK0BSH, BEART, THGBART, V2JOCZDT, TLA0QQP in the last 72 hours. INR:   Recent Labs     06/04/22  0941   INR 1.04     Lactate: No results for input(s): LACTATE in the last 72 hours. Cultures:  -----------------------------------------------------------------  RAD:   CT ABDOMEN PELVIS W IV CONTRAST Additional Contrast? None   Final Result      Bilateral pleural effusions with bibasilar airspace disease. Extensive ascites. Diffuse intra and extra hepatic biliary duct dilatation may indicate biliary ductal obstruction. Sludge seen within gallbladder lumen. Multiple renal cysts which have increased in size and number since prior study. XR CHEST PORTABLE   Final Result      Bibasilar airspace disease. Pleural effusions. CTA HEAD NECK W CONTRAST   Final Result      1. No arterial steno-occlusive disease.    2. penetration   - continuous IVF @ 50 ml/hr    -Holding PEG placement for now      Hx COPD   Hx PE   - Eliquis BID from PE in May 2022 changed to lovenox BID         NSTEMI Type 2   -Trop elevated - will trend.  Likely demand ischemia from poor PO intake.  Echo showed 60-65% with grade 1 diastolic.       Venous stasis ulcers b/l LE   -Podiatry consulted   - wound care consulted      BPH  - Continue with flomax      HLD  -holding Lipitor 10mg      HTN   -Continue with Lopressor 12.5mg Daily ( was on 25mg but was changed on DC from [facility])   -Continue lisinopril 40mg and Hydrochlorothiazide      CAD  -Was on ASA 324mg but now hes off since hes been on Eliquis      MCV  -Folate and B12, increased   - started on thiamine         Code Status: full code  FEN: npo   PPX: lovenox held for paracentesis in am   DISPO:       Shelton Florence MD, PGY-3  06/05/22  12:47 PM    This patient has been staffed and discussed with Gautam Francisco MD.

## 2022-06-05 NOTE — PLAN OF CARE
Problem: Discharge Planning  Goal: Discharge to home or other facility with appropriate resources  Outcome: Progressing  Note: Discharge on hold d/t pt change in status yesterday after aspirating during meal.  Pt to have PEG tube placed possibly on Monday. Problem: Pain  Goal: Verbalizes/displays adequate comfort level or baseline comfort level  Outcome: Progressing  Note: Pt denies pain, but expresses discomfort in feet overnight, which is relieved with repositioning upward towards head of bed. Problem: Confusion  Goal: Confusion, delirium, dementia, or psychosis is improved or at baseline  Description: INTERVENTIONS:  1. Assess for possible contributors to thought disturbance, including medications, impaired vision or hearing, underlying metabolic abnormalities, dehydration, psychiatric diagnoses, and notify attending LIP  2. [place] high risk fall precautions, as indicated  3. Provide frequent short contacts to provide reality reorientation, refocusing and direction  4. Decrease environmental stimuli, including noise as appropriate  5. Monitor and intervene to maintain adequate nutrition, hydration, elimination, sleep and activity  6. If unable to ensure safety without constant attention obtain sitter and review sitter guidelines with assigned personnel  7. Initiate Psychosocial CNS and Spiritual Care consult, as indicated  Outcome: Progressing  Note: Pt confused to all but self and hallucinating periodically throughout night shift. Asks, Ilan Odonnell is that over there? \" when there is no other person in room. Problem: Skin/Tissue Integrity  Goal: Absence of new skin breakdown  Description: 1. Monitor for areas of redness and/or skin breakdown  2. Assess vascular access sites hourly  3. Every 4-6 hours minimum:  Change oxygen saturation probe site  4.   Every 4-6 hours:  If on nasal continuous positive airway pressure, respiratory therapy assess nares and determine need for appliance change or resting period. Outcome: Progressing  Note: No new skin breakdown noted. Pt incontinent of stool overnight. Non-bloody hemorrhoids noted, but no skin breakdown. Problem: Safety - Adult  Goal: Free from fall injury  Outcome: Progressing  Note: Every safety precaution in place, including bed alarm and camera. Pt does, at times, become agitated and attempts to throw legs over side of bed as if trying to get out of bed. Monitoring this closely, reorienting pt, and repositioning him back in bed. Problem: ABCDS Injury Assessment  Goal: Absence of physical injury  Outcome: Progressing  Note: No falls or injuries this night shift. Problem: Nutrition Deficit:  Goal: Optimize nutritional status  Outcome: Progressing  Note: Pt currently NPO pending PEG tube placement. Mouth care given.

## 2022-06-06 ENCOUNTER — APPOINTMENT (OUTPATIENT)
Dept: ULTRASOUND IMAGING | Age: 82
DRG: 177 | End: 2022-06-06
Payer: MEDICARE

## 2022-06-06 LAB
ANION GAP SERPL CALCULATED.3IONS-SCNC: 6 MMOL/L (ref 3–16)
BASOPHILS ABSOLUTE: 0 K/UL (ref 0–0.2)
BASOPHILS RELATIVE PERCENT: 0.1 %
BUN BLDV-MCNC: 17 MG/DL (ref 7–20)
CALCIUM SERPL-MCNC: 9.1 MG/DL (ref 8.3–10.6)
CHLORIDE BLD-SCNC: 104 MMOL/L (ref 99–110)
CO2: 40 MMOL/L (ref 21–32)
CREAT SERPL-MCNC: 1 MG/DL (ref 0.8–1.3)
EOSINOPHILS ABSOLUTE: 0 K/UL (ref 0–0.6)
EOSINOPHILS RELATIVE PERCENT: 0.2 %
GFR AFRICAN AMERICAN: >60
GFR NON-AFRICAN AMERICAN: >60
GLUCOSE BLD-MCNC: 76 MG/DL (ref 70–99)
HCT VFR BLD CALC: 28.2 % (ref 40.5–52.5)
HEMOGLOBIN: 9.2 G/DL (ref 13.5–17.5)
LYMPHOCYTES ABSOLUTE: 0.5 K/UL (ref 1–5.1)
LYMPHOCYTES RELATIVE PERCENT: 12.6 %
MAGNESIUM: 1.9 MG/DL (ref 1.8–2.4)
MCH RBC QN AUTO: 33.4 PG (ref 26–34)
MCHC RBC AUTO-ENTMCNC: 32.6 G/DL (ref 31–36)
MCV RBC AUTO: 102.5 FL (ref 80–100)
MONOCYTES ABSOLUTE: 0.2 K/UL (ref 0–1.3)
MONOCYTES RELATIVE PERCENT: 5.6 %
NEUTROPHILS ABSOLUTE: 3.5 K/UL (ref 1.7–7.7)
NEUTROPHILS RELATIVE PERCENT: 81.5 %
PDW BLD-RTO: 15.7 % (ref 12.4–15.4)
PLATELET # BLD: 112 K/UL (ref 135–450)
PMV BLD AUTO: 9.2 FL (ref 5–10.5)
POTASSIUM REFLEX MAGNESIUM: 3.4 MMOL/L (ref 3.5–5.1)
RBC # BLD: 2.75 M/UL (ref 4.2–5.9)
SODIUM BLD-SCNC: 150 MMOL/L (ref 136–145)
WBC # BLD: 4.3 K/UL (ref 4–11)

## 2022-06-06 PROCEDURE — 6360000002 HC RX W HCPCS: Performed by: INTERNAL MEDICINE

## 2022-06-06 PROCEDURE — 2700000000 HC OXYGEN THERAPY PER DAY

## 2022-06-06 PROCEDURE — 6370000000 HC RX 637 (ALT 250 FOR IP): Performed by: INTERNAL MEDICINE

## 2022-06-06 PROCEDURE — 2580000003 HC RX 258

## 2022-06-06 PROCEDURE — 6360000002 HC RX W HCPCS: Performed by: STUDENT IN AN ORGANIZED HEALTH CARE EDUCATION/TRAINING PROGRAM

## 2022-06-06 PROCEDURE — 36415 COLL VENOUS BLD VENIPUNCTURE: CPT

## 2022-06-06 PROCEDURE — 92526 ORAL FUNCTION THERAPY: CPT

## 2022-06-06 PROCEDURE — 85025 COMPLETE CBC W/AUTO DIFF WBC: CPT

## 2022-06-06 PROCEDURE — 2580000003 HC RX 258: Performed by: INTERNAL MEDICINE

## 2022-06-06 PROCEDURE — 94640 AIRWAY INHALATION TREATMENT: CPT

## 2022-06-06 PROCEDURE — 2060000000 HC ICU INTERMEDIATE R&B

## 2022-06-06 PROCEDURE — 89051 BODY FLUID CELL COUNT: CPT

## 2022-06-06 PROCEDURE — 83735 ASSAY OF MAGNESIUM: CPT

## 2022-06-06 PROCEDURE — 94761 N-INVAS EAR/PLS OXIMETRY MLT: CPT

## 2022-06-06 PROCEDURE — 80048 BASIC METABOLIC PNL TOTAL CA: CPT

## 2022-06-06 RX ORDER — POTASSIUM CHLORIDE 7.45 MG/ML
10 INJECTION INTRAVENOUS
Status: COMPLETED | OUTPATIENT
Start: 2022-06-06 | End: 2022-06-06

## 2022-06-06 RX ORDER — DEXTROSE MONOHYDRATE 50 MG/ML
INJECTION, SOLUTION INTRAVENOUS CONTINUOUS
Status: DISCONTINUED | OUTPATIENT
Start: 2022-06-06 | End: 2022-06-08 | Stop reason: HOSPADM

## 2022-06-06 RX ADMIN — THIAMINE HYDROCHLORIDE 100 MG: 100 INJECTION, SOLUTION INTRAMUSCULAR; INTRAVENOUS at 12:12

## 2022-06-06 RX ADMIN — SODIUM CHLORIDE 3000 MG: 900 INJECTION INTRAVENOUS at 16:30

## 2022-06-06 RX ADMIN — POTASSIUM CHLORIDE 10 MEQ: 7.46 INJECTION, SOLUTION INTRAVENOUS at 12:11

## 2022-06-06 RX ADMIN — IPRATROPIUM BROMIDE AND ALBUTEROL SULFATE 1 AMPULE: 2.5; .5 SOLUTION RESPIRATORY (INHALATION) at 22:35

## 2022-06-06 RX ADMIN — SODIUM CHLORIDE 3000 MG: 900 INJECTION INTRAVENOUS at 22:11

## 2022-06-06 RX ADMIN — IPRATROPIUM BROMIDE AND ALBUTEROL SULFATE 1 AMPULE: 2.5; .5 SOLUTION RESPIRATORY (INHALATION) at 15:20

## 2022-06-06 RX ADMIN — POTASSIUM CHLORIDE 10 MEQ: 7.46 INJECTION, SOLUTION INTRAVENOUS at 14:04

## 2022-06-06 RX ADMIN — SODIUM CHLORIDE 3000 MG: 900 INJECTION INTRAVENOUS at 09:26

## 2022-06-06 RX ADMIN — IPRATROPIUM BROMIDE AND ALBUTEROL SULFATE 1 AMPULE: 2.5; .5 SOLUTION RESPIRATORY (INHALATION) at 08:40

## 2022-06-06 RX ADMIN — DEXTROSE MONOHYDRATE: 50 INJECTION, SOLUTION INTRAVENOUS at 12:09

## 2022-06-06 RX ADMIN — SODIUM CHLORIDE 3000 MG: 900 INJECTION INTRAVENOUS at 04:19

## 2022-06-06 RX ADMIN — SODIUM CHLORIDE, PRESERVATIVE FREE 10 ML: 5 INJECTION INTRAVENOUS at 22:20

## 2022-06-06 RX ADMIN — SODIUM CHLORIDE, PRESERVATIVE FREE 10 ML: 5 INJECTION INTRAVENOUS at 12:12

## 2022-06-06 ASSESSMENT — PAIN SCALES - GENERAL
PAINLEVEL_OUTOF10: 0
PAINLEVEL_OUTOF10: 0

## 2022-06-06 ASSESSMENT — PAIN SCALES - WONG BAKER
WONGBAKER_NUMERICALRESPONSE: 0

## 2022-06-06 NOTE — PROGRESS NOTES
Phone call from patient's niece at bedside. This RN in to assess and patient is trying to climb out of bed. being very aggressive with family and staff, states he wants to leave and just die. He will not calm down and cannot be easily redirected. Secure chat sent Dr. Satya Park awaiting new orders if any.

## 2022-06-06 NOTE — PROGRESS NOTES
Internal Medicine Progress Note    Admit Date: 6/1/2022    Diet: Diet NPO    CC:AMS    Interval history: No acute evens reported overnight. Remains on Unasyn. Vital stable, afebrile. Aspiration precautions, remains NPO       Medications:     Scheduled Meds:   potassium chloride  10 mEq IntraVENous Q1H    ipratropium-albuterol  1 ampule Inhalation TID    ampicillin-sulbactam  3,000 mg IntraVENous Q6H    sodium chloride flush  5-40 mL IntraVENous 2 times per day    [Held by provider] aspirin  325 mg Oral Daily    [Held by provider] metoprolol succinate  12.5 mg Oral Daily    [Held by provider] atorvastatin  10 mg Oral Daily    [Held by provider] tamsulosin  0.4 mg Oral Daily    [Held by provider] lisinopril  40 mg Oral Daily    thiamine  100 mg IntraVENous Daily    enoxaparin  1 mg/kg SubCUTAneous BID    [Held by provider] melatonin  10 mg Oral Nightly     Continuous Infusions:   dextrose      sodium chloride 50 mL/hr at 06/05/22 2256    sodium chloride       PRN Meds:glucose, dextrose bolus **OR** dextrose bolus, glucagon (rDNA), dextrose, ipratropium-albuterol, sodium chloride flush, sodium chloride, ondansetron **OR** ondansetron, polyethylene glycol, acetaminophen **OR** acetaminophen, hydrALAZINE    Objective:   Vitals:   T-max:  Patient Vitals for the past 8 hrs:   BP Temp Temp src Pulse Resp SpO2 Weight   06/06/22 0512 -- -- -- -- -- -- 194 lb 3.6 oz (88.1 kg)   06/06/22 0414 (!) 148/68 97.3 °F (36.3 °C) Oral 70 23 95 % --       Intake/Output Summary (Last 24 hours) at 6/6/2022 2338  Last data filed at 6/6/2022 0600  Gross per 24 hour   Intake 0 ml   Output 3100 ml   Net -3100 ml       Physical Exam  Constitutional:       General: He is not in acute distress. Comments: Somnolent    Cardiovascular:      Rate and Rhythm: Normal rate and regular rhythm. Pulmonary:      Comments: Coarse breath sounds bilateral   Abdominal:      General: Abdomen is flat.  Bowel sounds are normal. There is distension. Palpations: Abdomen is soft. Musculoskeletal:      Right lower leg: No edema. Left lower leg: No edema. Skin:     General: Skin is warm. Neurological:      Comments: Unable to assess   Psychiatric:      Comments: Unable to assess            LABS:    CBC:   Recent Labs     06/04/22  0605 06/05/22  0548 06/06/22  0630   WBC 3.5* 6.9 4.3   HGB 10.0* 9.9* 9.2*   HCT 31.2* 30.8* 28.2*   * 117* 112*   .0* 102.8* 102.5*     Renal:    Recent Labs     06/04/22  0605 06/05/22  0548 06/06/22  0630   * 148* 150*   K 4.2 3.7 3.4*    106 104   CO2 39* 34* 40*   BUN 19 20 17   CREATININE 1.0 1.1 1.0   GLUCOSE 58* 111* 76   CALCIUM 9.0 8.9 9.1   MG 2.00 2.00 1.90   ANIONGAP 4 8 6     Hepatic:   Recent Labs     06/04/22  0941   AST 76*   ALT 68*   BILITOT 5.8*   BILIDIR 5.3*   PROT 5.3*   LABALBU 2.4*   ALKPHOS 662*     Troponin:   No results for input(s): TROPONINI in the last 72 hours. BNP: No results for input(s): BNP in the last 72 hours. Lipids: No results for input(s): CHOL, HDL in the last 72 hours. Invalid input(s): LDLCALCU, TRIGLYCERIDE  ABGs:  No results for input(s): PHART, PTE0BNF, PO2ART, RJZ2MSV, BEART, THGBART, A7XKUCNN, ILM5RSS in the last 72 hours. INR:   Recent Labs     06/04/22  0941 06/05/22  1421   INR 1.04 1.13     Lactate: No results for input(s): LACTATE in the last 72 hours. Cultures:  -----------------------------------------------------------------  RAD:   CT ABDOMEN PELVIS W IV CONTRAST Additional Contrast? None   Final Result      Bilateral pleural effusions with bibasilar airspace disease. Extensive ascites. Diffuse intra and extra hepatic biliary duct dilatation may indicate biliary ductal obstruction. Sludge seen within gallbladder lumen. Multiple renal cysts which have increased in size and number since prior study. XR CHEST PORTABLE   Final Result      Bibasilar airspace disease. Pleural effusions. CTA HEAD NECK W CONTRAST   Final Result      1. No arterial steno-occlusive disease. 2.  Bilateral pleural effusions. FL MODIFIED BARIUM SWALLOW W VIDEO   Final Result      1. Deep laryngeal penetration with thin liquid consistency by straw and cup and with nectar consistency. CT HEAD WO CONTRAST   Final Result      Cerebral atrophy. No acute intracranial findings. XR CHEST PORTABLE   Final Result      Ill-defined airspace density in the left base, consistent with early infiltrate or atelectasis. No other acute cardiopulmonary findings. US GUIDED PARACENTESIS    (Results Pending)         Assessment/Plan:   Pt is a 80 yo M with a PMHx of HLD, HTN , CAD s/p cardiac cath 2012 with stents, GERD, PVD who presents with fatigue and SOB.     Possible Aspiration event  Patient O2 requirements have increased while at the SNF according to the daughter, however patient is at baseline O2 since being discharged at [facility] after a PE. CXR showed left sided infilatrates as read by radiologist but unclear to myself as it looks similar to previous CXR. Does not look infectious to me or an acute issue. Issues seem more chronic. No Leukocytosis or recorded fevers.   VBG 7.41/68/30      - Procal 0.24   - ESR elevated, CRP elevated    - s/p Cefepime and Vanc ( 6/1 - 6/3)  - MRSA swab sent    - Blood cultures x2 NGTD    - IV fluids 100/h NS   - MBS: revealing laryngeal penetration    -Will remain on Unasyn after aspiration event      Ascites  Likely from portal  HTN  Elevated bili 2/2 obstruction  -CT abd pelvis revealing  B/l pleural effusions and airspace disease . Extensive ascites. Diffuse  Intra and extrabili duct dialation may indicate biliary ductal obs. Sludge in gall bladder   - IR consulted for diagnostic paracentesis to be done today.   -s/p a dose of lasix 40 IV yesterday  -Albumin   - MRI/MRCP  - NPO, NGT      Failure to thrive   Severe calorie restriction  - Dietary consulted-

## 2022-06-06 NOTE — PROGRESS NOTES
Assessment  78 yo M with history of COPD, PE, HTN, HLD, CAD admitted with FTT and concern for aspiration pneumonia. High risk for ongoing aspiration per SLP and gastrostomy recommended. On evaluation noted to have new large volume ascites as well as persistent biliary dilation which has been noted since outside hospital stay 4/2022. He has had progressive rise in alk phos and now with hyperbilirubinemia. Planned gastrostomy canceled due to presence of ascites. On review of CT, there is a mass in the left lateral liver which was favored to represent a hemangioma on prior imaging but cannot exclude neoplasm. No apparent evidence of biliary or pancreatic neoplasm, but this remains a concern with painless jaundice. Patient and daughter amenable to nasogastric feeding tube. Plan:  -Paracentesis as planned sent for routine studies to include albumin, protein, cytology, culture, cell count/diff  -Will repeat MRCP to assess for pancreatic/biliary mass or less likely choledocholithiasis. -Recommend nasogastric feeding tube after paracentesis complete. Would benefit from additional free water with rising sodium.  -Based on MRCP, tentative plan for ERCP vs EUS     Subjective: We are following for ascites, dysphagia, and biliary obstruction. Additional history obtained from patient's daughter. Patient states recurrently that he wants to go home. He denies abdominal pain, pruritis, nausea, or vomiting. Objective:    Review of Systems:    Constitutional: Negative for fever, chills, and unexpected weight change. HENT: Negative for trouble swallowing. Respiratory: Negative for cough, chest tightness and shortness of breath. Cardiovascular: Negative for chest pain  Gastrointestinal: see HPI  Musculoskeletal: Negative for unusual arthralgias. Skin: Negative for rash.      Scheduled Meds:   ipratropium-albuterol  1 ampule Inhalation TID    ampicillin-sulbactam  3,000 mg IntraVENous Q6H   

## 2022-06-06 NOTE — CONSULTS
[address] [id number] Continence Nurse  Consult Note       NAME:  Bettye Salvador  MEDICAL RECORD NUMBER:  [id number]  AGE: 79 y.o. GENDER: male  : 1940  TODAY'S DATE:  2022    Subjective   Reason for WOCN Evaluation and Assessment: Sacrum - Stage 2 (2 areas noted)      Bettye Salvador is a 79 y.o. male referred by:   [] Physician  [x] Nursing  [] Other:     Wound Identification:  Wound Type: pressure  Contributing Factors: chronic pressure, decreased mobility, shear force and PVD    Wound History: Pt is a 78 yo M with a PMHx of HLD, HTN , CAD s/p cardiac cath  with stents, GERD, PVD who presents with fatigue and SOB. Patient was recently discharged for PE, UTI and MADDI with hypernatremia and was treated with Ab and discharged to SNF. COPD is suspected as an underlying cause along with PE. The daughter was bed side who was saying the patient never fully recovered and was more altered today. Patient was brought to the hospital for further work up. Patient was a long time smoker who quit only  A few months prior to his admission at [facility] in May 2022. Denies any ETOH or illicit drug abuse. Pt was feeling more SOB but denied any pain, fevers, chills, nausea, vomiting, dysuria or hematuria. Pt has had poor PO intake the past few days. Current Wound Care Treatment: Sacrum - foam    Patient Goal of Care:  [x] Wound Healing  [] Odor Control  [] Palliative Care  [] Pain Control   [] Other:         PAST MEDICAL HISTORY        Diagnosis Date    Hyperlipidemia     Hypertension        PAST SURGICAL HISTORY    History reviewed. No pertinent surgical history. FAMILY HISTORY    History reviewed. No pertinent family history.     SOCIAL HISTORY    Social History     Tobacco Use    Smoking status: Former Smoker     Packs/day: 1.00     Years: 70.00     Pack years: 70.00     Types: Cigarettes     Quit date: 2022     Years since quittin.0    Smokeless tobacco: Never Used   Substance Use Topics    Alcohol use: No    Drug use: No       ALLERGIES    Allergies   Allergen Reactions    Penicillins Rash     Tolerated Ampicillin/sulbactam 6/4/22       MEDICATIONS    No current facility-administered medications on file prior to encounter.      Current Outpatient Medications on File Prior to Encounter   Medication Sig Dispense Refill    mineral oil-hydrophilic petrolatum (AQUAPHOR) ointment Apply topically at bedtime Apply to B/L LE nightly with wound care      bisacodyl (DULCOLAX) 10 MG suppository Place 10 mg rectally daily as needed for Constipation      apixaban (ELIQUIS) 5 MG TABS tablet Take 5 mg by mouth 2 times daily      Sodium Phosphates (FLEET) 7-19 GM/118ML Place 1 enema rectally daily as needed (for constipation if dulcolax suppository is ineffective)      ipratropium-albuterol (DUONEB) 0.5-2.5 (3) MG/3ML SOLN nebulizer solution Inhale 1 vial into the lungs every 4 hours as needed for Shortness of Breath      magnesium hydroxide (MILK OF MAGNESIA) 400 MG/5ML suspension Take 30 mLs by mouth daily as needed for Constipation      acetaminophen (TYLENOL) 325 mg tablet Take 650 mg by mouth every 4 hours as needed for Pain      metoprolol succinate (TOPROL XL) 25 MG extended release tablet Take 25 mg by mouth daily       lisinopril (PRINIVIL;ZESTRIL) 40 MG tablet Take 40 mg by mouth daily      simvastatin (ZOCOR) 20 MG tablet Take 20 mg by mouth nightly      tamsulosin (FLOMAX) 0.4 MG capsule Take 0.4 mg by mouth daily      multivitamin-iron-minerals-folic acid (CENTRUM) chewable tablet Take 1 tablet by mouth daily      Omega-3 Fatty Acids (FISH OIL) 1000 MG CAPS Take 1,000 mg by mouth daily          Objective    /65   Pulse 91   Temp 97.2 °F (36.2 °C) (Axillary)   Resp 17   Ht 6' 5\" (1.956 m)   Wt 194 lb 3.6 oz (88.1 kg)   SpO2 99%   BMI 23.03 kg/m²     LABS:  WBC:    Lab Results   Component Value Date    WBC 4.3 06/06/2022     H/H:    Lab Results   Component Value Date    HGB 9.2 06/06/2022    HCT 28.2 06/06/2022     PTT:  No results found for: APTT, PTT[APTT}  PT/INR:    Lab Results   Component Value Date    PROTIME 14.4 06/05/2022    INR 1.13 06/05/2022     HgBA1c:  No results found for: LABA1C    Assessment: Sacrum - 2 areas measured together, wound bed is pink/red, periwound intact   Hi Risk Score: Hi Scale Score: 14    Patient Active Problem List   Diagnosis Code    Syncope and collapse R55    HTN (hypertension) I10    Stage 3 chronic kidney disease (HCC) N18.30    CAD (coronary artery disease) I25.10    Hospital-acquired bacterial pneumonia J15.9    FTT (failure to thrive) in adult R62.7       Measurements:  Wound 06/01/22 Sacrum (Active)   Wound Image   06/06/22 1517   Wound Etiology Pressure Stage 2 06/06/22 1517   Dressing Status New dressing applied 06/06/22 1517   Wound Cleansed Not Cleansed 06/06/22 1517   Dressing/Treatment Foam 06/06/22 1517   Dressing Change Due 06/09/22 06/06/22 1517   Wound Length (cm) 1 cm 06/06/22 1517   Wound Width (cm) 3 cm 06/06/22 1517   Wound Depth (cm) 0.1 cm 06/06/22 1517   Wound Surface Area (cm^2) 3 cm^2 06/06/22 1517   Wound Volume (cm^3) 0.3 cm^3 06/06/22 1517   Wound Assessment Pink/red 06/06/22 1517   Drainage Amount Scant 06/06/22 1517   Drainage Description Serosanguinous 06/06/22 1517   Odor None 06/06/22 1517   Promise-wound Assessment Dry/flaky;Fragile; Intact 06/06/22 1517   Margins Attached edges; Defined edges 06/06/22 1517   Number of days: 4   Sacrum:      Response to treatment:  Well tolerated by patient.      Pain Assessment:  Severity:  0 / 10  Quality of pain: N/A  Wound Pain Timing/Severity: none  Premedicated: No    Plan   Plan of Care: Wound 06/01/22 Sacrum-Dressing/Treatment: Foam   Recommendation: Sacrum - clean with NS, dry, cover with foam dressing, change every 3 days  Reposition pt every 2 hours  Call Wound Care for deterioration [phone]    Specialty Bed Required : Yes   [x] Low Air Loss   [x] Pressure Redistribution  [] Fluid Immersion  [] Bariatric  [] Total Pressure Relief  [] Other:     Current Diet: Diet NPO  Dietician consult:  No    Discharge Plan:  Placement for patient upon discharge: skilled nursing    Patient appropriate for Outpatient [address]: No    Referrals:  [x]   [] [facility]  [] Supplies  [] Other    Patient/Caregiver Teaching:  Level of patient/caregiver understanding able to:   [] Indicates understanding       [] Needs reinforcement  [] Unsuccessful      [] Verbal Understanding  [] Demonstrated understanding       [] No evidence of learning  [] Refused teaching         [] N/A       Electronically signed by Wei Neumann RN, Nisreen Cristina on 6/6/2022 at 3:18 PM

## 2022-06-06 NOTE — PROGRESS NOTES
Palliative Care Chart Review  and Check in Note:     NAME:  [address] Date: 6/1/2022  Hospital Day:  Hospital Day: 6   Current Code status: Limited    Palliative care is continuing to following Mr. Edvin Evans for symptom management,  and goals of care discussion as needed. Patient's chart reviewed today 6/6/22. Pt is resting comfortably but has been intermittently agitated. Called and spoke with his daughter Sumaya Matthew. She is considering hospice and we agreed to talk further after his paracentesis. The following are the currently established goals/code status, and Symptom management. Goals of care: Daughter wants to proceed with paracentesis and any interventions that will help with his comfort, but would not want to pursue any aggressive treatment for cancer. She is considering hospice.     Code status: DNR/DNI (Limited- no to all interventions)    Discharge plan: SHERRELL Azevedo NP  [address] [employer]

## 2022-06-06 NOTE — PLAN OF CARE
Problem: Discharge Planning  Goal: Discharge to home or other facility with appropriate resources  Outcome: Progressing  Note: Pt had been preparing for dc to [facility] with hospice when he aspirated during a meal.  He now has bilat pleural effusions and significant ascites that will need to be addressed later this a.m. with paracentesis. PEG tube placement being considered, but now on hold. Problem: Pain  Goal: Verbalizes/displays adequate comfort level or baseline comfort level  Outcome: Progressing  Flowsheets (Taken 6/5/2022 1700 by Trever Nascimento RN)  Verbalizes/displays adequate comfort level or baseline comfort level:   Encourage patient to monitor pain and request assistance   Assess pain using appropriate pain scale   Administer analgesics based on type and severity of pain and evaluate response   Implement non-pharmacological measures as appropriate and evaluate response   Consider cultural and social influences on pain and pain management   Notify Licensed Independent Practitioner if interventions unsuccessful or patient reports new pain  Note: Pt does not verbalize pain, but discomfort when he slides toward bottom of bed and feet press against the footboard. Pt comfortable with frequent repositioning and warm blankets. Problem: Confusion  Goal: Confusion, delirium, dementia, or psychosis is improved or at baseline  Description: INTERVENTIONS:  1. Assess for possible contributors to thought disturbance, including medications, impaired vision or hearing, underlying metabolic abnormalities, dehydration, psychiatric diagnoses, and notify attending LIP  2. [place] high risk fall precautions, as indicated  3. Provide frequent short contacts to provide reality reorientation, refocusing and direction  4. Decrease environmental stimuli, including noise as appropriate  5. Monitor and intervene to maintain adequate nutrition, hydration, elimination, sleep and activity  6.  If unable to ensure safety without constant attention obtain sitter and review sitter guidelines with assigned personnel  7. Initiate Psychosocial CNS and Spiritual Care consult, as indicated  Outcome: Progressing  Note: Pt's speech overnight is nonsensical; very confused and, at times, talking to persons who are not in the room as well as pointing to and asking about them. Problem: Skin/Tissue Integrity  Goal: Absence of new skin breakdown  Description: 1. Monitor for areas of redness and/or skin breakdown  2. Assess vascular access sites hourly  3. Every 4-6 hours minimum:  Change oxygen saturation probe site  4. Every 4-6 hours:  If on nasal continuous positive airway pressure, respiratory therapy assess nares and determine need for appliance change or resting period. Outcome: Progressing  Note: No new skin breakdown noted. New foam mepilex dressing placed over sacral ulcerations. Heels elevated off of bed with pillows. Problem: Safety - Adult  Goal: Free from fall injury  Outcome: Progressing  Note: Safety precautions in place, including bed alarm and camera. Pt being monitored closely as he frequently becomes agitated and begins to slide legs over side of bed. Side rails up x 3. Will continue to monitor. Problem: ABCDS Injury Assessment  Goal: Absence of physical injury  Outcome: Progressing  Note: No falls or injuries this hospitalization. Problem: Nutrition Deficit:  Goal: Optimize nutritional status  Outcome: Progressing  Note: Pt NPO. Mouth care performed.

## 2022-06-06 NOTE — CARE COORDINATION
AKIN called [facility] to follow up. Nora Armendariz reported that staff spoke with daughter, David Guardado and that after the recent rapid response, the plan is the same - skilled without hospice. CM met with David Guardado at bedside to review, as plan to DC to [address] [facility] on [address]. David Guardado confirmed this, and that she will request hospice again depending on how pt's medical needs change. AKIN spoke with Chen at [facility] to update.         Electronically signed by VICTORINA Maldonado, CATIE on 6/6/2022 at 11:13 AM

## 2022-06-06 NOTE — PLAN OF CARE
Problem: Discharge Planning  Goal: Discharge to home or other facility with appropriate resources  6/6/2022 1912 by Nell Ugarte RN  Outcome: Progressing  6/6/2022 0608 by Jo-Ann Allison RN  Outcome: Progressing  Note: Pt had been preparing for dc to [facility] with hospice when he aspirated during a meal.  He now has bilat pleural effusions and significant ascites that will need to be addressed later this a.m. with paracentesis. PEG tube placement being considered, but now on hold.        Problem: Pain  Goal: Verbalizes/displays adequate comfort level or baseline comfort level  6/6/2022 1912 by Nell Ugarte RN  Outcome: Progressing  Flowsheets  Taken 6/6/2022 1214  Verbalizes/displays adequate comfort level or baseline comfort level:   Encourage patient to monitor pain and request assistance   Assess pain using appropriate pain scale   Administer analgesics based on type and severity of pain and evaluate response   Implement non-pharmacological measures as appropriate and evaluate response   Consider cultural and social influences on pain and pain management   Notify Licensed Independent Practitioner if interventions unsuccessful or patient reports new pain  Taken 6/6/2022 0840  Verbalizes/displays adequate comfort level or baseline comfort level:   Encourage patient to monitor pain and request assistance   Assess pain using appropriate pain scale   Administer analgesics based on type and severity of pain and evaluate response   Implement non-pharmacological measures as appropriate and evaluate response   Notify Licensed Independent Practitioner if interventions unsuccessful or patient reports new pain  6/6/2022 0608 by Jo-Ann Allison RN  Outcome: Progressing  Flowsheets (Taken 6/5/2022 1700 by Nell Ugarte RN)  Verbalizes/displays adequate comfort level or baseline comfort level:   Encourage patient to monitor pain and request assistance   Assess pain using appropriate pain scale   Administer analgesics based on type and severity of pain and evaluate response   Implement non-pharmacological measures as appropriate and evaluate response   Consider cultural and social influences on pain and pain management   Notify Licensed Independent Practitioner if interventions unsuccessful or patient reports new pain  Note: Pt does not verbalize pain, but discomfort when he slides toward bottom of bed and feet press against the footboard. Pt comfortable with frequent repositioning and warm blankets. Problem: Confusion  Goal: Confusion, delirium, dementia, or psychosis is improved or at baseline  Description: INTERVENTIONS:  1. Assess for possible contributors to thought disturbance, including medications, impaired vision or hearing, underlying metabolic abnormalities, dehydration, psychiatric diagnoses, and notify attending LIP  2. [place] high risk fall precautions, as indicated  3. Provide frequent short contacts to provide reality reorientation, refocusing and direction  4. Decrease environmental stimuli, including noise as appropriate  5. Monitor and intervene to maintain adequate nutrition, hydration, elimination, sleep and activity  6. If unable to ensure safety without constant attention obtain sitter and review sitter guidelines with assigned personnel  7.  Initiate Psychosocial CNS and Spiritual Care consult, as indicated  6/6/2022 1912 by Emilia Guevara RN  Outcome: Progressing  Flowsheets  Taken 6/6/2022 1605  Effect of thought disturbance (confusion, delirium, dementia, or psychosis) are managed with adequate functional status:   Assess for contributors to thought disturbance, including medications, impaired vision or hearing, underlying metabolic abnormalities, dehydration, psychiatric diagnoses, notify [place] high risk fall precautions, as indicated   Decrease environmental stimuli, including noise as appropriate   Monitor and intervene to maintain adequate nutrition, hydration, elimination, sleep and activity   Provide frequent short contacts to provide reality reorientation, refocusing and direction   If unable to ensure safety without constant attention obtain sitter and review sitter guidelines with assigned personnel   Initiate Psychosocial Clinical Nurse Specialist and [facility] consult, as indicated  Taken 6/6/2022 1200  Effect of thought disturbance (confusion, delirium, dementia, or psychosis) are managed with adequate functional status:   Assess for contributors to thought disturbance, including medications, impaired vision or hearing, underlying metabolic abnormalities, dehydration, psychiatric diagnoses, notify [place] high risk fall precautions, as indicated   Provide frequent short contacts to provide reality reorientation, refocusing and direction   Decrease environmental stimuli, including noise as appropriate   Monitor and intervene to maintain adequate nutrition, hydration, elimination, sleep and activity   If unable to ensure safety without constant attention obtain sitter and review sitter guidelines with assigned personnel   Initiate Psychosocial Clinical Nurse Specialist and [facility] consult, as indicated  Taken 6/6/2022 0900  Effect of thought disturbance (confusion, delirium, dementia, or psychosis) are managed with adequate functional status:   Assess for contributors to thought disturbance, including medications, impaired vision or hearing, underlying metabolic abnormalities, dehydration, psychiatric diagnoses, notify [place] high risk fall precautions, as indicated   If unable to ensure safety without constant attention obtain sitter and review sitter guidelines with assigned personnel   Monitor and intervene to maintain adequate nutrition, hydration, elimination, sleep and activity   Decrease environmental stimuli, including noise as appropriate   Provide frequent short contacts to provide reality reorientation, refocusing and direction   Initiate Psychosocial Clinical Nurse Specialist and Spiritual Care consult, as indicated  6/6/2022 0608 by Torito Wright RN  Outcome: Progressing  Note: Pt's speech overnight is nonsensical; very confused and, at times, talking to persons who are not in the room as well as pointing to and asking about them. Problem: Skin/Tissue Integrity  Goal: Absence of new skin breakdown  Description: 1. Monitor for areas of redness and/or skin breakdown  2. Assess vascular access sites hourly  3. Every 4-6 hours minimum:  Change oxygen saturation probe site  4. Every 4-6 hours:  If on nasal continuous positive airway pressure, respiratory therapy assess nares and determine need for appliance change or resting period. 6/6/2022 1912 by Dali Costello RN  Outcome: Progressing  6/6/2022 0608 by Torito Wright RN  Outcome: Progressing  Note: No new skin breakdown noted. New foam mepilex dressing placed over sacral ulcerations. Heels elevated off of bed with pillows. Problem: Safety - Adult  Goal: Free from fall injury  6/6/2022 1912 by Dali Costello RN  Outcome: Progressing  Flowsheets (Taken 6/6/2022 0900)  Free From Fall Injury:   Instruct family/caregiver on patient safety   Based on caregiver fall risk screen, instruct family/caregiver to ask for assistance with transferring infant if caregiver noted to have fall risk factors  6/6/2022 0608 by Torito Wright RN  Outcome: Progressing  Note: Safety precautions in place, including bed alarm and camera. Pt being monitored closely as he frequently becomes agitated and begins to slide legs over side of bed. Side rails up x 3. Will continue to monitor.        Problem: ABCDS Injury Assessment  Goal: Absence of physical injury  6/6/2022 1912 by Dali Costello RN  Outcome: Progressing  Flowsheets (Taken 6/6/2022 0900)  Absence of Physical Injury: Implement safety measures based on patient assessment  6/6/2022 0608 by Torito Wright RN  Outcome: Progressing  Note: No falls or injuries this hospitalization. Problem: Nutrition Deficit:  Goal: Optimize nutritional status  6/6/2022 1912 by Sandra Lima RN  Outcome: Progressing  6/6/2022 0608 by Janine Salguero RN  Outcome: Progressing  Note: Pt NPO. Mouth care performed.

## 2022-06-06 NOTE — PROGRESS NOTES
Speech Language Pathology  Facility/Department: [address]  Dysphagia Daily Treatment Note    NAME: Alex Gonzalez  : 1940  MRN: [id number]    Patient Diagnosis(es):   Patient Active Problem List    Diagnosis Date Noted    FTT (failure to thrive) in adult 2022    Hospital-acquired bacterial pneumonia 2022    Syncope and collapse 2018    HTN (hypertension) 2018    Stage 3 chronic kidney disease ([facility] [address] 2018    CAD (coronary artery disease) 2018     Allergies: Allergies   Allergen Reactions    Penicillins Rash     Tolerated Ampicillin/sulbactam 22     Recent Chest Xray: (2022)  mpression       Ill-defined airspace density in the left base, consistent with early infiltrate or atelectasis.       No other acute cardiopulmonary findings.      CT of Head: (2022)  Impression       Cerebral atrophy.       No acute intracranial findings. Chart reviewed. Medical Diagnosis: Altered mental status, unspecified altered mental status type [R41.82]  Hospital-acquired bacterial pneumonia [J15.9]  Pneumonia of lower lobe due to infectious organism, unspecified laterality [J18.9]   Treatment Diagnosis:dysphagia    BSE Impression 22  Dysphagia Impression : Suspect oropharyngeal dysphagia, needs further assessment. With patient seated up in bed on 3 L O2 via nc (awake and participatory, but lethargic and requiring cues), assessed tolerance ice chips, thins via tsp/straw, puree. Pt with positive oral acceptance, slowed oral prep with holding, positive but seemingly delayed laryngeal swallow movement, oral stasis, inconsistent wet reactive cough. Recommend continue NPO, with frequent oral hygiene w/ suction kit, ice chips with nursing. Needs instrumental assessment, however given current lethargy and scheduling limitations, recommend defer till tomorrow. Will continue to follow.   Dysphagia Diagnosis: Suspected needs further assessment    MBS results 6/3/22  Oral Phase: Mild-moderate oral dysphagia characterized by prolonged oral prep, slowed A-P propulsion, premature posterior spillage, mild oral stasis. Advanced solid consistency deferred for safety.     Pharyngeal phase: Moderate-severe pharyngeal dysphagia characterized by impairments in timing, sensation, hyolaryngeal mechanics, and pharyngeal constriction. Resulted in premature spillage of all liquid boluses to pyriforms, reduced airway protection with deep penetration and silent aspiration of thin liquids by cup/straw, and deep penetration and suspected aspiration nectar thick liquid trials (difficult to assess d/t pt movement). Penetration and aspiration not observed for puree, thins via tsp, or for honey thick via cup; however, for honey thick liquids, significant diffuse pharyngeal residue present. D/t confusion, pt not able to utilize compensatory strategies. recommend pureed with thin by tsp    Pain: denied pain   Current Diet : Diet NPO   Recommended Form of Meds: Via alternative means of nutrition      Treatment:  Pt seen bedside to address the following goals:    Goal 1: Patient will participate in further assessment of swallow function as appropriate  . 6/6- MBS completed 6/3 and it was recommended that pt be on a pureed diet with thin liquids by tsp only. On 6/4, pt noted to have had an aspiration event and was made NPO. On this date, pt alert, but easily agitated, frequenting asking for ice water and ice chips. Pt with congested cough prior to PO trials. Performed oral care with suction. Pt noted to have bloody material of unknown origin on tongue. RN present and aware. Pt was analyzed with 6 trials with ice chips and 1 tsp of water. With 80% of trials, Pt noted to masticate ice chip and then pt would ask for another; however, no laryngeal movement noted visually or upon palpation. Pt instructed to to swallow, but pt noted to have difficulty with swallow initiation.  When Options for NPO v QOL (not yet discussed with daughter/POA d/t MD needing to speak with pt/daughter)    1) strict NPO with feeding tube placed for nutrition (PEG not possible at this time due to ascites)   -or-  2) feeding tube placement while allowing Pt to have small amounts of PO for pleasure.   -or-  3) Allow pt to eat safest diet which would be pureed with thin by tsp  with strict aspiration precautions and strategies with the knowledge that pt is at high risk for aspirating all PO   -or-  4) Allow pt to consume whatever he/she wishes, with strict aspiration precautions and strategies with the knowledge that pt is at high risk for aspirating all PO    DC recommendation: TBD closer to discharge   Treatment: 27  D/W nursing, Erin   Needs met prior to leaving room, call button in reach.     [place], [place]  Speech-Language Pathologist  Pager [phone]    If patient is discharged prior to next treatment, this note will serve as the discharge summary

## 2022-06-07 ENCOUNTER — APPOINTMENT (OUTPATIENT)
Dept: ULTRASOUND IMAGING | Age: 82
DRG: 177 | End: 2022-06-07
Payer: MEDICARE

## 2022-06-07 ENCOUNTER — APPOINTMENT (OUTPATIENT)
Dept: GENERAL RADIOLOGY | Age: 82
DRG: 177 | End: 2022-06-07
Payer: MEDICARE

## 2022-06-07 ENCOUNTER — APPOINTMENT (OUTPATIENT)
Dept: MRI IMAGING | Age: 82
DRG: 177 | End: 2022-06-07
Payer: MEDICARE

## 2022-06-07 LAB
ANION GAP SERPL CALCULATED.3IONS-SCNC: 4 MMOL/L (ref 3–16)
APPEARANCE FLUID: CLEAR
BASOPHILS ABSOLUTE: 0 K/UL (ref 0–0.2)
BASOPHILS RELATIVE PERCENT: 0.6 %
BUN BLDV-MCNC: 14 MG/DL (ref 7–20)
CALCIUM SERPL-MCNC: 9 MG/DL (ref 8.3–10.6)
CELL COUNT FLUID TYPE: NORMAL
CHLORIDE BLD-SCNC: 103 MMOL/L (ref 99–110)
CLOT EVALUATION: NORMAL
CO2: 42 MMOL/L (ref 21–32)
COLOR FLUID: YELLOW
CREAT SERPL-MCNC: 1 MG/DL (ref 0.8–1.3)
EOSINOPHILS ABSOLUTE: 0 K/UL (ref 0–0.6)
EOSINOPHILS RELATIVE PERCENT: 0.3 %
GFR AFRICAN AMERICAN: >60
GFR NON-AFRICAN AMERICAN: >60
GLUCOSE BLD-MCNC: 98 MG/DL (ref 70–99)
GLUCOSE, FLUID: 112 MG/DL
HCT VFR BLD CALC: 31 % (ref 40.5–52.5)
HEMOGLOBIN: 10.3 G/DL (ref 13.5–17.5)
LD, FLUID: 77 U/L
LYMPHOCYTES ABSOLUTE: 0.6 K/UL (ref 1–5.1)
LYMPHOCYTES RELATIVE PERCENT: 14.4 %
LYMPHOCYTES, BODY FLUID: 57 %
MACROPHAGE FLUID: 8 %
MAGNESIUM: 1.8 MG/DL (ref 1.8–2.4)
MCH RBC QN AUTO: 33.5 PG (ref 26–34)
MCHC RBC AUTO-ENTMCNC: 33.2 G/DL (ref 31–36)
MCV RBC AUTO: 100.9 FL (ref 80–100)
MESOTHELIAL FLUID: 5 %
MONOCYTE, FLUID: 29 %
MONOCYTES ABSOLUTE: 0.2 K/UL (ref 0–1.3)
MONOCYTES RELATIVE PERCENT: 5.8 %
NEUTROPHIL, FLUID: 1 %
NEUTROPHILS ABSOLUTE: 3 K/UL (ref 1.7–7.7)
NEUTROPHILS RELATIVE PERCENT: 78.9 %
NUCLEATED CELLS FLUID: 73 /CUMM
NUMBER OF CELLS COUNTED FLUID: 100
PDW BLD-RTO: 15.6 % (ref 12.4–15.4)
PLATELET # BLD: 121 K/UL (ref 135–450)
PMV BLD AUTO: 9.1 FL (ref 5–10.5)
POTASSIUM REFLEX MAGNESIUM: 3.2 MMOL/L (ref 3.5–5.1)
RBC # BLD: 3.07 M/UL (ref 4.2–5.9)
RBC FLUID: 1000 /CUMM
SODIUM BLD-SCNC: 149 MMOL/L (ref 136–145)
WBC # BLD: 3.9 K/UL (ref 4–11)

## 2022-06-07 PROCEDURE — 6370000000 HC RX 637 (ALT 250 FOR IP): Performed by: INTERNAL MEDICINE

## 2022-06-07 PROCEDURE — 87070 CULTURE OTHR SPECIMN AEROBIC: CPT

## 2022-06-07 PROCEDURE — 83615 LACTATE (LD) (LDH) ENZYME: CPT

## 2022-06-07 PROCEDURE — 74183 MRI ABD W/O CNTR FLWD CNTR: CPT

## 2022-06-07 PROCEDURE — 97530 THERAPEUTIC ACTIVITIES: CPT

## 2022-06-07 PROCEDURE — 88112 CYTOPATH CELL ENHANCE TECH: CPT

## 2022-06-07 PROCEDURE — 83735 ASSAY OF MAGNESIUM: CPT

## 2022-06-07 PROCEDURE — 2700000000 HC OXYGEN THERAPY PER DAY

## 2022-06-07 PROCEDURE — 6360000002 HC RX W HCPCS: Performed by: STUDENT IN AN ORGANIZED HEALTH CARE EDUCATION/TRAINING PROGRAM

## 2022-06-07 PROCEDURE — 87205 SMEAR GRAM STAIN: CPT

## 2022-06-07 PROCEDURE — 2580000003 HC RX 258: Performed by: INTERNAL MEDICINE

## 2022-06-07 PROCEDURE — 94761 N-INVAS EAR/PLS OXIMETRY MLT: CPT

## 2022-06-07 PROCEDURE — 6360000004 HC RX CONTRAST MEDICATION: Performed by: INTERNAL MEDICINE

## 2022-06-07 PROCEDURE — 82945 GLUCOSE OTHER FLUID: CPT

## 2022-06-07 PROCEDURE — 2060000000 HC ICU INTERMEDIATE R&B

## 2022-06-07 PROCEDURE — 85025 COMPLETE CBC W/AUTO DIFF WBC: CPT

## 2022-06-07 PROCEDURE — 92526 ORAL FUNCTION THERAPY: CPT

## 2022-06-07 PROCEDURE — 82042 OTHER SOURCE ALBUMIN QUAN EA: CPT

## 2022-06-07 PROCEDURE — 88305 TISSUE EXAM BY PATHOLOGIST: CPT

## 2022-06-07 PROCEDURE — 74018 RADEX ABDOMEN 1 VIEW: CPT

## 2022-06-07 PROCEDURE — 94640 AIRWAY INHALATION TREATMENT: CPT

## 2022-06-07 PROCEDURE — 36415 COLL VENOUS BLD VENIPUNCTURE: CPT

## 2022-06-07 PROCEDURE — 97535 SELF CARE MNGMENT TRAINING: CPT

## 2022-06-07 PROCEDURE — 80048 BASIC METABOLIC PNL TOTAL CA: CPT

## 2022-06-07 PROCEDURE — 97110 THERAPEUTIC EXERCISES: CPT

## 2022-06-07 PROCEDURE — 0W9G3ZZ DRAINAGE OF PERITONEAL CAVITY, PERCUTANEOUS APPROACH: ICD-10-PCS | Performed by: RADIOLOGY

## 2022-06-07 PROCEDURE — 2709999900 US GUIDED PARACENTESIS

## 2022-06-07 PROCEDURE — 2580000003 HC RX 258

## 2022-06-07 PROCEDURE — A9576 INJ PROHANCE MULTIPACK: HCPCS | Performed by: INTERNAL MEDICINE

## 2022-06-07 PROCEDURE — 6360000002 HC RX W HCPCS: Performed by: INTERNAL MEDICINE

## 2022-06-07 RX ORDER — POTASSIUM CHLORIDE 7.45 MG/ML
10 INJECTION INTRAVENOUS
Status: COMPLETED | OUTPATIENT
Start: 2022-06-07 | End: 2022-06-07

## 2022-06-07 RX ADMIN — SODIUM CHLORIDE 3000 MG: 900 INJECTION INTRAVENOUS at 17:18

## 2022-06-07 RX ADMIN — POTASSIUM CHLORIDE 10 MEQ: 10 INJECTION, SOLUTION INTRAVENOUS at 09:49

## 2022-06-07 RX ADMIN — IPRATROPIUM BROMIDE AND ALBUTEROL SULFATE 1 AMPULE: 2.5; .5 SOLUTION RESPIRATORY (INHALATION) at 09:21

## 2022-06-07 RX ADMIN — IPRATROPIUM BROMIDE AND ALBUTEROL SULFATE 1 AMPULE: 2.5; .5 SOLUTION RESPIRATORY (INHALATION) at 21:06

## 2022-06-07 RX ADMIN — SODIUM CHLORIDE 3000 MG: 900 INJECTION INTRAVENOUS at 05:01

## 2022-06-07 RX ADMIN — DEXTROSE MONOHYDRATE: 50 INJECTION, SOLUTION INTRAVENOUS at 03:18

## 2022-06-07 RX ADMIN — SODIUM CHLORIDE 3000 MG: 900 INJECTION INTRAVENOUS at 23:00

## 2022-06-07 RX ADMIN — DEXTROSE MONOHYDRATE: 50 INJECTION, SOLUTION INTRAVENOUS at 09:35

## 2022-06-07 RX ADMIN — IPRATROPIUM BROMIDE AND ALBUTEROL SULFATE 1 AMPULE: 2.5; .5 SOLUTION RESPIRATORY (INHALATION) at 15:53

## 2022-06-07 RX ADMIN — GADOTERIDOL 19 ML: 279.3 INJECTION, SOLUTION INTRAVENOUS at 19:10

## 2022-06-07 RX ADMIN — POTASSIUM CHLORIDE 10 MEQ: 10 INJECTION, SOLUTION INTRAVENOUS at 13:01

## 2022-06-07 RX ADMIN — POTASSIUM CHLORIDE 10 MEQ: 10 INJECTION, SOLUTION INTRAVENOUS at 11:49

## 2022-06-07 RX ADMIN — POTASSIUM CHLORIDE 10 MEQ: 10 INJECTION, SOLUTION INTRAVENOUS at 14:30

## 2022-06-07 RX ADMIN — SODIUM CHLORIDE 3000 MG: 900 INJECTION INTRAVENOUS at 09:44

## 2022-06-07 RX ADMIN — THIAMINE HYDROCHLORIDE 100 MG: 100 INJECTION, SOLUTION INTRAMUSCULAR; INTRAVENOUS at 09:36

## 2022-06-07 RX ADMIN — SODIUM CHLORIDE, PRESERVATIVE FREE 10 ML: 5 INJECTION INTRAVENOUS at 21:00

## 2022-06-07 ASSESSMENT — PAIN SCALES - GENERAL
PAINLEVEL_OUTOF10: 0

## 2022-06-07 ASSESSMENT — PAIN SCALES - WONG BAKER: WONGBAKER_NUMERICALRESPONSE: 0

## 2022-06-07 NOTE — PROGRESS NOTES
Speech Language Pathology  Facility/Department: [address]  Dysphagia Daily Treatment Note    NAME: Christin Daily  : 1940  MRN: [id number]    Patient Diagnosis(es):   Patient Active Problem List    Diagnosis Date Noted    FTT (failure to thrive) in adult 2022    Hospital-acquired bacterial pneumonia 2022    Syncope and collapse 2018    HTN (hypertension) 2018    Stage 3 chronic kidney disease ([facility] [address] 2018    CAD (coronary artery disease) 2018     Allergies: Allergies   Allergen Reactions    Penicillins Rash     Tolerated Ampicillin/sulbactam 22     Recent Chest Xray: (2022)  mpression       Ill-defined airspace density in the left base, consistent with early infiltrate or atelectasis.       No other acute cardiopulmonary findings.      CT of Head: (2022)  Impression       Cerebral atrophy.       No acute intracranial findings. Chart reviewed. Medical Diagnosis: Altered mental status, unspecified altered mental status type [R41.82]  Hospital-acquired bacterial pneumonia [J15.9]  Pneumonia of lower lobe due to infectious organism, unspecified laterality [J18.9]   Treatment Diagnosis:dysphagia    BSE Impression 22  Dysphagia Impression : Suspect oropharyngeal dysphagia, needs further assessment. With patient seated up in bed on 3 L O2 via nc (awake and participatory, but lethargic and requiring cues), assessed tolerance ice chips, thins via tsp/straw, puree. Pt with positive oral acceptance, slowed oral prep with holding, positive but seemingly delayed laryngeal swallow movement, oral stasis, inconsistent wet reactive cough. Recommend continue NPO, with frequent oral hygiene w/ suction kit, ice chips with nursing. Needs instrumental assessment, however given current lethargy and scheduling limitations, recommend defer till tomorrow. Will continue to follow.   Dysphagia Diagnosis: Suspected needs further assessment    MBS results 6/3/22  Oral Phase: Mild-moderate oral dysphagia characterized by prolonged oral prep, slowed A-P propulsion, premature posterior spillage, mild oral stasis. Advanced solid consistency deferred for safety.     Pharyngeal phase: Moderate-severe pharyngeal dysphagia characterized by impairments in timing, sensation, hyolaryngeal mechanics, and pharyngeal constriction. Resulted in premature spillage of all liquid boluses to pyriforms, reduced airway protection with deep penetration and silent aspiration of thin liquids by cup/straw, and deep penetration and suspected aspiration nectar thick liquid trials (difficult to assess d/t pt movement). Penetration and aspiration not observed for puree, thins via tsp, or for honey thick via cup; however, for honey thick liquids, significant diffuse pharyngeal residue present. D/t confusion, pt not able to utilize compensatory strategies. recommend pureed with thin by tsp    Pain: denied pain   Current Diet : Diet NPO with oral care with suction with small amounts of ice chips  Recommended Form of Meds: Via alternative means of nutrition      Treatment:  Pt seen bedside to address the following goals:    Goal 1: Patient will participate in further assessment of swallow function as appropriate  . 6/6- MBS completed 6/3 and it was recommended that pt be on a pureed diet with thin liquids by tsp only. On 6/4, pt noted to have had an aspiration event and was made NPO. On this date, pt alert, but easily agitated, frequenting asking for ice water and ice chips. Pt with congested cough prior to PO trials. Performed oral care with suction. Pt noted to have bloody material of unknown origin on tongue. RN present and aware. Pt was analyzed with 6 trials with ice chips and 1 tsp of water. With 80% of trials, Pt noted to masticate ice chip and then pt would ask for another; however, no laryngeal movement noted visually or upon palpation.  Pt instructed to to swallow, but pt noted to have difficulty with swallow initiation. When laryngeal movement was noted upon palpation, movement was very limited. Pt noted to produce a congested cough 80% of the time following trial.  Also of note, RR fluctuated between 18-33 with average hovering around 26- RR >25 carries a heightened aspiration risk. Given pt's performance and elevated RR, recommend con't NPO with oral care with suction followed by small amounts of ice chips for pleasure. Daughter arrived in the middle of the session and was able to witness pt's performance. con't goal   6/7-pt more calm this date. Performed oral care with suction. Pt with congested cough prior to PO trials. Pt presented with trials of water by tsp, ice chips and 2 trials of 1/2 tsp of applesauce. Pt instructed to perform effortful swallow with all trials. With all trials, pt appeared to initiate a swallow in a fairly timely manner this date. Pt with congested cough following 20% of trials with ice chips and water by tsp. Of concern, respiratory rate fluctuated between 22-35 through out the session. RR >25 carries a heightened aspiration risk. Because of this, recommend con't NPO with oral care with suction with small amounts of ice chips to encourage swallow function. con't goal      Goal 2: Patient/caregiver will demonstrate understanding of swallowing concerns/recommendations  6/6- attempted to educated pt to anatomy and physiology of the swallow and concerns for aspiration, but was unable to fully engage pt in the conversation. Educated daughter, Cole Stone, who is POA to the anatomy and physiology of the swallow, results of MBS, impact RR has on swallowing function, and high risk for aspiration given pt's RR and impaired swallowing function. Daughter stated comprehension. Per the RN, Blessing Ambriz, the daughter is an RN. Was not able to further educated the daughter to  NPO vs QOL option as GI MD entered the room to discuss pt's GI issues.  Daughter did say to the pt at one point that if he was done with all of this testing and procedures to let her know and they would stop. con't goal   6/7-  Pt educated to the purpose of the visit, concerns for swallowing and rational for NPO recommendation. Pt with questionable comprehension. No family present this date. con't goal   .  Goal 3: Patient will tolerate least restrictive diet without overt signs of aspiration or associated decline respiratory status. 6/6-HOLD goal- pt not safe for PO intake at this time. See details under goal #1. Patient/Family/Caregiver Education:  .see above     Compensatory Strategies: (with ice chips only)  Oral care with suction  Upright  Present one ice chip at a time  Watch for swallow movement   Encourage effortful swallow     Plan:  Continue dysphagia treatment with goals per plan of care. Diet recommendations:NPO with oral care with suction with small amounts of ice chips for comfort     Options for NPO v QOL (not yet discussed with daughter/POA)   1) strict NPO with feeding tube placed for nutrition (PEG not possible at this time due to ascites)   -or-  2) feeding tube placement while allowing Pt to have small amounts of PO for pleasure.   -or-  3) Allow pt to eat safest diet which would be pureed with thin by tsp  with strict aspiration precautions and strategies with the knowledge that pt is at high risk for aspirating all PO   -or-  4) Allow pt to consume whatever he/she wishes, with strict aspiration precautions and strategies with the knowledge that pt is at high risk for aspirating all PO    DC recommendation: TBD closer to discharge   Treatment: 15  D/W nursing, Erin   Needs met prior to leaving room, call button in reach.     Uriah Wolcott, [place], [address]  Speech-Language Pathologist  Pager [phone]    If patient is discharged prior to next treatment, this note will serve as the discharge summary

## 2022-06-07 NOTE — PROGRESS NOTES
Physical Therapy  Daily Treatment Note    Discharge Recommendations: Bob Guillen scored a 8/24 on the AM-PAC short mobility form. Current research shows that an AM-PAC score of 17 or less is typically not associated with a discharge to the patient's home setting. Based on the patient's AM-PAC score and their current functional mobility deficits, it is recommended that the patient have 3-5 sessions per week of Physical Therapy at d/c to increase the patient's independence. Please see assessment section for further patient specific details. Assessment:  Pt needing less assist overall for bed mobility and sitting balance this session. Needing min encouragement to sit EOB. Continues to be below his baseline for mobility. Pt would benefit from continued IP PT at D/C to maximize strength and independence. Plan is for SNF. Equipment Needs: Defer to next level of care    Chart Reviewed: Yes     Other position/activity restrictions: up with assist   Additional Pertinent Hx: pt is an 78 yo male presenting with AMS from nursing home      Diagnosis: hospital aquired bacterial pneumonia   Treatment Diagnosis: dec functional mobility    Subjective: Pt in bed initially. Agreeable to working with PT after min encouragement. \"I had 30 ounces of Pepsi last night and 3 plates of food. Chicken and rice. \"  \"Can I have some water? \" Pt reminded that he is currently NPO. RN aware of request.     Pain: Denies    Objective:    Bed mobility  Rolling: Mod assist x 1 (with cues) to R and L  Supine to sit: Mod assist x 1  Scooting: Min assist to EOB; Min assist x 1) side scoot towards HOB  Sit to Supine: Dependent (Min assist x 2)    Balance  Sat EOB x 7 minutes total. Needing SBA when static. Pt leaning forward with elbows on knees. Pt then performing reaching activities (minimally outside DESHAWN), with CGA when reaching with R hand (x 2 reps) and Min assist when reaching with L hand (x 2 reps). Pt declined further reaching activities. Patient Education  Role of PT. Expressed understanding. Safety Devices  Pt left with needs in reach. In bed with bed alarm on. RN updated. AM-PAC score  AM-PAC Inpatient Mobility Raw Score : 8  AM-PAC Inpatient T-Scale Score : 28.52  Mobility Inpatient CMS 0-100% Score: 86.62  Mobility Inpatient CMS G-Code Modifier : CM    Goals: (as determined and assessed by primary PT)  Time Frame for Short term goals: by dc  Short term goal 1: pt will perform bed mobility with min A   Short term goal 2: pt will perform sit <> stand transfers at [address] [place] [address] with mod A   Short term goal 3: pt will perform bed <> chair transfers with LRAD and mod A    Plan:  Plan:  (2-5x/week)  Current Treatment Recommendations: Strengthening,ROM,Balance training,Transfer training,Functional mobility training,Neuromuscular re-education,Safety education & training,Home exercise program,Patient/Caregiver education & training,Therapeutic activities,Gait training    Therapy Time    Individual  Concurrent  Group  Co-treatment    Time In  1336            Time Out  1414            Minutes  38              Timed Code Treatment Minutes: 38 minutes  Total Treatment Minutes: 38 minutes    Will continue per plan of care. If patient is discharged prior to next treatment, this note will serve as the discharge summary.     [place], [place] #[id number]

## 2022-06-07 NOTE — PROGRESS NOTES
Assessment  78 yo M with history of COPD, PE, HTN, HLD, CAD admitted with FTT and concern for aspiration pneumonia. High risk for ongoing aspiration per SLP and gastrostomy recommended. On evaluation noted to have new large volume ascites as well as persistent biliary dilation which has been noted since outside hospital stay 4/2022. He has had progressive rise in alk phos and now with hyperbilirubinemia. Planned gastrostomy canceled due to presence of ascites. On review of CT, there is a mass in the left lateral liver which was favored to represent a hemangioma on prior imaging but cannot exclude neoplasm. No apparent evidence of biliary or pancreatic neoplasm, but this remains a concern with painless jaundice. Patient and daughter amenable to nasogastric feeding tube on discussion 6/6/22. Plan:   -Paracentesis as planned sent for routine studies to include albumin, protein, cytology, culture, cell count/diff  -Repeat MRCP pending  -Recommend nasogastric feeding tube for enteral nutrition.    -Based on MRCP, tentative plan for ERCP vs EUS. ERCP could be considered with palliative intent for biliary decompression. Subjective: We are following for ascites, dysphagia, and biliary obstruction. Patient provides limited responses this morning. Keeps head covered with sheet. Shakes and nods head in response. He denies abdominal pain, pruritis, nausea, or vomiting. Paracentesis planned this morning.         Objective:    Review of Systems:    Constitutional: Negative for fever, chills, and unexpected weight change. HENT: Negative for trouble swallowing. Respiratory: Negative for cough, chest tightness and shortness of breath. Cardiovascular: Negative for chest pain  Gastrointestinal: see HPI  Musculoskeletal: Negative for unusual arthralgias. Skin: Negative for rash.      Scheduled Meds:   potassium chloride  10 mEq IntraVENous Q1H    ipratropium-albuterol  1 ampule Inhalation TID    ampicillin-sulbactam  3,000 mg IntraVENous Q6H    sodium chloride flush  5-40 mL IntraVENous 2 times per day    [Held by provider] aspirin  325 mg Oral Daily    [Held by provider] metoprolol succinate  12.5 mg Oral Daily    [Held by provider] atorvastatin  10 mg Oral Daily    [Held by provider] tamsulosin  0.4 mg Oral Daily    [Held by provider] lisinopril  40 mg Oral Daily    thiamine  100 mg IntraVENous Daily    [Held by provider] enoxaparin  1 mg/kg SubCUTAneous BID    [Held by provider] melatonin  10 mg Oral Nightly     Continuous Infusions:   dextrose 75 mL/hr at 06/07/22 0935    dextrose      sodium chloride         Vitals:  BP (!) 153/82   Pulse 71   Temp 97.8 °F (36.6 °C) (Axillary)   Resp 26   Ht 6' 5\" (1.956 m)   Wt 194 lb 3.6 oz (88.1 kg)   SpO2 96%   BMI 23.03 kg/m²     Exam:  General:  comfortable, thin AAM.  Intermittent agitation. Heent: There is mild scleral icterus. Cardiovascular: The heart is regular rate and rhythm. Respiratory:  The patient's breathing is non-labored with normal chest wall excursion and normal muscle movement.    Abdomen:  The abdomen is nondistended, soft, and nontender. There are active bowel sounds. There are no masses or organomegaly  Rectal:  deferred   Neurological:  Patient is alert but withdrawn. Labs and Imaging:  I reviewed the labs and imaging results from last 24 hours.      Recent Labs     06/05/22  0548 06/06/22  0630 06/07/22  0736   HGB 9.9* 9.2* 10.3*   WBC 6.9 4.3 3.9*        Samantha Morales MD, MD  June 7, 2022

## 2022-06-07 NOTE — PROGRESS NOTES
Physician Progress Note      Gurmeet Mari  [facility] #:                  [id number]  :                       1940  ADMIT DATE:       2022 6:49 PM  [address] [place] DATE:  RESPONDING  PROVIDER #:        Katiuska Cowart MD          QUERY TEXT:    Patient admitted with aspiration pneumonia. Noted documentation of severe   malnutrition in PN 6/2. In order to support the diagnosis of severe   malnutrition, please include additional clinical indicators in your   documentation. Or please document if the diagnosis of severe malnutrition has   been ruled out after further study. The medical record reflects the following:  Risk Factors: 80 yo w/ aspiration pna, pockets food  Clinical Indicators: Per pN : Severe Protein Calorie Malnutrition. Per RD   :   At risk for malnutrition  Treatment: RD monitoring  Options provided:  -- Severe malnutrition was ruled out  -- Severe malnutrition present as evidenced by, Please document evidence.   -- Other - I will add my own diagnosis  -- Disagree - Not applicable / Not valid  -- Disagree - Clinically unable to determine / Unknown  -- Refer to Clinical Documentation Reviewer    PROVIDER RESPONSE TEXT:    Severe malnutrition is present as evidenced by muscle wasting, weight loss,   inadequate po intake, decreased endurance    Query created by: Shashi 2022 12:32 PM      Electronically signed by:  Katiuska Cowart MD 2022 12:47 PM

## 2022-06-07 NOTE — CARE COORDINATION
CM following: Chen with [facility] states the patient has been accepted and they are ready for him upon discharge.  Contact Chen at discharge [phone]  Electronically signed by VICTORINA Díaz on 6/7/2022 at 3:41 PM  [phone]

## 2022-06-07 NOTE — PROGRESS NOTES
Patient refused placement of NG tube x3 attempts states that he does not want it. Perfect serve to Dr. Marii Branham to make aware. MD in to speak with patient and patient agreeable to have NG tube placed. NG tube place and placement to be confirmed by xray. MD messaged following receipt of results and per Dr. Marii Branham, NG tube ok to use.  Awaiting further orders at this time if any

## 2022-06-07 NOTE — PROGRESS NOTES
Comprehensive Nutrition Assessment    Type and Reason for Visit:  Reassess    Nutrition Recommendations/Plan:   1. If family decides on enteral nutrition recommend Jevity 1.5 start at 10 ml/hr increase slowly by 10 ml every 8 hours due to risk of refeeding to goal rate of 60 ml/hr        Thiamin 100 mg daily and MVI daily due to risk of refeeding syndrome         Proteinex Protein supplement once per day, 30 ml water flush before and after            2. Water flush per nephrology due to hypernatermia  3. Monitor goals of care. Malnutrition Assessment:  Malnutrition Status: At risk for malnutrition (Comment) (06/03/22 1412)    Context:  Acute Illness       Nutrition Assessment:    Follow up: NPO as of 6/4/22. Speech therapy continues to recommend NPO due to high aspiration risk. Nutrition Related Findings:    Na 149 this am-D5% ordered at 75 ml/hr on 6/6/22 Wound Type: Wound Consult Pending       Current Nutrition Intake & Therapies:    Average Meal Intake: NPO  Average Supplements Intake: NPO  Diet NPO    Anthropometric Measures:  Height: 6' 5\" (195.6 cm)  Ideal Body Weight (IBW): 208 lbs (95 kg)       Current Body Weight: 194 lb 3 oz (88.1 kg),   IBW. Weight Source: Bed Scale  Current BMI (kg/m2): 23                          BMI Categories: Normal Weight (BMI 18.5-24. 9)    Estimated Daily Nutrient Needs:  Energy Requirements Based On: Kcal/kg  Weight Used for Energy Requirements: Current  Energy (kcal/day): 3604-2231 (25-30)  Weight Used for Protein Requirements: Current  Protein (g/day): 105-123 (1.2-1.4)  Method Used for Fluid Requirements: 1 ml/kcal  Fluid (ml/day): 6431-8079    Nutrition Diagnosis:   · Inadequate energy intake related to increase demand for energy/nutrients,cognitive or neurological impairment,swallowing difficulty as evidenced by NPO or clear liquid status due to medical condition,swallow study results      Nutrition Interventions:   Food and/or Nutrient Delivery: Continue NPO (pending goals of care, will likely need feeding tube if going to SNF without Hospice)  Nutrition Education/Counseling: No recommendation at this time  Coordination of Nutrition Care: Continue to monitor while inpatient       Goals:     Goals: other (specify)  Specify Other Goals: pt will tolerate most appropriate form of nutrition to meet >75% energy needs    Nutrition Monitoring and Evaluation:      Food/Nutrient Intake Outcomes: Diet Advancement/Tolerance  Physical Signs/Symptoms Outcomes: Nutrition Focused Physical Findings,Chewing or Swallowing,Biochemical Data,Fluid Status or Edema    Discharge Planning:     Too soon to determine     EVELINA, [address] [facility], [address] [place] [address],   Contact: [phone]

## 2022-06-07 NOTE — PROGRESS NOTES
Occupational Therapy  Facility/Department: [address] [facility] [address]  Daily Treatment Note  NAME: Asuncion Holder  : 1940  MRN: [id number]    Date of Service: 2022    Discharge Recommendations:  Asuncion Holder scored a 8/24 on the AM-PAC ADL Inpatient form. Current research shows that an AM-PAC score of 17 or less is typically not associated with a discharge to the patient's home setting. Based on the patient's AM-PAC score and their current ADL deficits, it is recommended that the patient have 3-5 sessions per week of Occupational Therapy at d/c to increase the patient's independence. Please see assessment section for further patient specific details. If patient discharges prior to next session this note will serve as a discharge summary. Please see below for the latest assessment towards goals. OT Equipment Recommendations  Equipment Needed: No      Patient Diagnosis(es): The primary encounter diagnosis was Altered mental status, unspecified altered mental status type. A diagnosis of Pneumonia of lower lobe due to infectious organism, unspecified laterality was also pertinent to this visit. Assessment    Assessment: Pt requiring encouragment for participation requiring Mod A for Supine to sit and Min A x2 for sit to supine. Pt able to complete simple grooming task supine with Mod A. Sitting EOB for 7 min with SBA static and Min A for dynamic sitting. Pt would benefit from inpt OT upon discharge. Continue OT per Baldemar 41. Activity Tolerance: Patient tolerated treatment well;Patient limited by fatigue;Patient limited by endurance  Equipment Needed: No      Plan  If pt discharges prior to next treatment, this note will serve as discharge summary  Plan  Times per Week: 2-5  Times per Day: Daily  Current Treatment Recommendations: Strengthening;ROM;Balance training;Functional mobility training; Endurance training; Safety education & training;Cognitive reorientation;Self-Care / ADL     Restrictions Position Activity Restriction  Other position/activity restrictions: up with assist    Subjective   Subjective  Subjective: Pt met supine and agreeable to OT session with encouragment. Pt responsive to most commands with increased time. Orientation  Overall Orientation Status: Impaired  Orientation Level: Oriented to place;Oriented to person;Oriented to time;Disoriented to situation  Cognition  Overall Cognitive Status: Exceptions  Arousal/Alertness: Delayed responses to stimuli  Following Commands: Follows one step commands with repetition  Attention Span: Difficulty attending to directions        Objective    Vitals     Bed Mobility Training  Bed Mobility Training: Yes  Rolling: Moderate assistance (rolling L and R for urine incontinence clean up. Pt able to follow cues for reaching to bed rail and positioning)  Supine to Sit: Moderate assistance  Sit to Supine: Minimum assistance;Assist X2  Scooting: Minimum assistance;Assist X2;Total assistance (Min A for scooting to EOB and Min A x2 for lateral scooting and Max  Ax2 for scooting  up supine)  Balance  Sitting: Impaired  Sitting - Static: Unsupported (Pt sitting EOB with elbows on knees with SBA)  Sitting - Dynamic: Fair (occasional) (Pt requiring Min A when reaching with LUE and CGA when reaching with R UE)  Transfer Training  Transfer Training: No     ADL  Feeding: NPO  Feeding Skilled Clinical Factors: Pt asking for water and mouth swab offered but decined  Grooming: Moderate assistance (Pt washing face and hands supine in bed with increased time and assist)  LE Dressing: Dependent/Total (donning gown supine)  Toileting: Dependent/Total (Pt with pure wick placed and requiring assist x2 for urine clean up)        Safety Devices  Type of Devices: Left in bed;Call light within reach; Chair alarm in place;Gait belt;Bed alarm in place     Patient Education  Education Given To: Patient  Education Provided: Role of Therapy  Education Provided Comments: activity promotion  Education Method: Verbal  Barriers to Learning: Cognition  Education Outcome: Continued education needed; Unable to demonstrate understanding    Goals (as determined and assessed by primary OT)  Short Term Goals  Time Frame for Short term goals: Discharge  Short Term Goal 1: unsupported sitting at EOB w/ Min A for sitting balance - goal met 6/7  Short Term Goal 2: simple grooming tasks w/ setup, Min A -ongoing  Short Term Goal 3: improve sit-stand transfer to Min A of 2 - ongoing  Patient Goals   Patient goals : no goal stated       Therapy Time   Individual Concurrent Group Co-treatment   Time In 1336         Time Out 1415         Minutes 39         Timed Code Treatment Minutes: 130 Wilmer Rosales, R Steve Reddy 46

## 2022-06-07 NOTE — PROGRESS NOTES
Internal Medicine Progress Note    Admit Date: 6/1/2022    Diet: Diet NPO    CC:AMS    Interval history: No acute evens reported overnight. Remains on Unasyn. Vital stable, afebrile. Pt scheduled for paracentesis this am      Medications:     Scheduled Meds:   potassium chloride  20 mEq IntraVENous Q1H    ipratropium-albuterol  1 ampule Inhalation TID    ampicillin-sulbactam  3,000 mg IntraVENous Q6H    sodium chloride flush  5-40 mL IntraVENous 2 times per day    [Held by provider] aspirin  325 mg Oral Daily    [Held by provider] metoprolol succinate  12.5 mg Oral Daily    [Held by provider] atorvastatin  10 mg Oral Daily    [Held by provider] tamsulosin  0.4 mg Oral Daily    [Held by provider] lisinopril  40 mg Oral Daily    thiamine  100 mg IntraVENous Daily    [Held by provider] enoxaparin  1 mg/kg SubCUTAneous BID    [Held by provider] melatonin  10 mg Oral Nightly     Continuous Infusions:   dextrose 75 mL/hr at 06/07/22 0318    dextrose      sodium chloride       PRN Meds:glucose, dextrose bolus **OR** dextrose bolus, glucagon (rDNA), dextrose, ipratropium-albuterol, sodium chloride flush, sodium chloride, ondansetron **OR** ondansetron, polyethylene glycol, acetaminophen **OR** acetaminophen, hydrALAZINE    Objective:   Vitals:   T-max:  Patient Vitals for the past 8 hrs:   BP Temp Temp src Pulse Resp SpO2   06/07/22 0922 -- -- -- -- -- 96 %   06/07/22 0841 (!) 153/82 97.8 °F (36.6 °C) Axillary 71 26 98 %   06/07/22 0304 (!) 146/71 97.8 °F (36.6 °C) Axillary 57 18 96 %       Intake/Output Summary (Last 24 hours) at 6/7/2022 0933  Last data filed at 6/7/2022 0824  Gross per 24 hour   Intake --   Output 1200 ml   Net -1200 ml       Physical Exam  Constitutional:       General: He is not in acute distress. Comments: Somnolent    Cardiovascular:      Rate and Rhythm: Normal rate and regular rhythm.    Pulmonary:      Comments: Coarse breath sounds bilateral   Abdominal:      General: Abdomen is flat. Bowel sounds are normal. There is distension. Palpations: Abdomen is soft. Musculoskeletal:      Right lower leg: No edema. Left lower leg: No edema. Skin:     General: Skin is warm. Neurological:      Comments: Unable to assess   Psychiatric:      Comments: Unable to assess            LABS:    CBC:   Recent Labs     06/05/22  0548 06/06/22  0630 06/07/22  0736   WBC 6.9 4.3 3.9*   HGB 9.9* 9.2* 10.3*   HCT 30.8* 28.2* 31.0*   * 112* 121*   .8* 102.5* 100.9*     Renal:    Recent Labs     06/05/22  0548 06/06/22  0630 06/07/22  0736   * 150* 149*   K 3.7 3.4* 3.2*    104 103   CO2 34* 40* 42*   BUN 20 17 14   CREATININE 1.1 1.0 1.0   GLUCOSE 111* 76 98   CALCIUM 8.9 9.1 9.0   MG 2.00 1.90 1.80   ANIONGAP 8 6 4     Hepatic:   Recent Labs     06/04/22  0941   AST 76*   ALT 68*   BILITOT 5.8*   BILIDIR 5.3*   PROT 5.3*   LABALBU 2.4*   ALKPHOS 662*     Troponin:   No results for input(s): TROPONINI in the last 72 hours. BNP: No results for input(s): BNP in the last 72 hours. Lipids: No results for input(s): CHOL, HDL in the last 72 hours. Invalid input(s): LDLCALCU, TRIGLYCERIDE  ABGs:  No results for input(s): PHART, HNR6XOV, PO2ART, WHB0JZX, BEART, THGBART, A5GMTGDY, JMK6YWL in the last 72 hours. INR:   Recent Labs     06/04/22  0941 06/05/22  1421   INR 1.04 1.13     Lactate: No results for input(s): LACTATE in the last 72 hours. Cultures:  -----------------------------------------------------------------  RAD:   CT ABDOMEN PELVIS W IV CONTRAST Additional Contrast? None   Final Result      Bilateral pleural effusions with bibasilar airspace disease. Extensive ascites. Diffuse intra and extra hepatic biliary duct dilatation may indicate biliary ductal obstruction. Sludge seen within gallbladder lumen. Multiple renal cysts which have increased in size and number since prior study.          XR CHEST PORTABLE   Final Result Bibasilar airspace disease. Pleural effusions. CTA HEAD NECK W CONTRAST   Final Result      1. No arterial steno-occlusive disease. 2.  Bilateral pleural effusions. FL MODIFIED BARIUM SWALLOW W VIDEO   Final Result      1. Deep laryngeal penetration with thin liquid consistency by straw and cup and with nectar consistency. CT HEAD WO CONTRAST   Final Result      Cerebral atrophy. No acute intracranial findings. XR CHEST PORTABLE   Final Result      Ill-defined airspace density in the left base, consistent with early infiltrate or atelectasis. No other acute cardiopulmonary findings. US GUIDED PARACENTESIS    (Results Pending)   MRI ABDOMEN W WO CONTRAST MRCP    (Results Pending)         Assessment/Plan:   Pt is a 80 yo M with a PMHx of HLD, HTN , CAD s/p cardiac cath 2012 with stents, GERD, PVD who presents with fatigue and SOB.     Possible Aspiration event  Patient O2 requirements have increased while at the SNF according to the daughter, however patient is at baseline O2 since being discharged at [facility] after a PE. CXR showed left sided infilatrates as read by radiologist but unclear to myself as it looks similar to previous CXR. Does not look infectious to me or an acute issue. Issues seem more chronic. No Leukocytosis or recorded fevers.   VBG 7.41/68/30      - Procal 0.24   - ESR elevated, CRP elevated    - s/p Cefepime and Vanc ( 6/1 - 6/3)  - MRSA swab sent    - Blood cultures x2 NGTD    - IV fluids 100/h NS   - MBS: revealing laryngeal penetration    -Will remain on Unasyn after aspiration event      Ascites  Likely from portal  HTN  Elevated bili 2/2 obstruction  -CT abd pelvis revealing  B/l pleural effusions and airspace disease . Extensive ascites. Diffuse  Intra and extrabili duct dialation may indicate biliary ductal obs. Sludge in gall bladder   - IR consulted for diagnostic paracentesis to be done today.   -s/p a dose of lasix 40 IV yesterday  -Albumin   - MRI/MRCP  - NPO, NGT      Failure to thrive   Severe calorie restriction  - Dietary consulted- Patient has not been eating or drinking recently   - SLP recs: keep pt NPO, dysphagia treatment, oral hygine, MBS today; consider alternative modes for nutrition temporarily   - consulted palliative care, pts POA wants full code for now   - consulted GI, consider duotube for feeding   - MBS: revealing laryngeal penetration   - continuous IVF @ 50 ml/hr    -Holding PEG placement for now given ascities       Hx COPD   Hx PE   - Eliquis BID from PE in May 2022 changed to lovenox BID         NSTEMI Type 2   -Trop elevated - will trend.  Likely demand ischemia from poor PO intake.  Echo showed 60-65% with grade 1 diastolic.       Venous stasis ulcers b/l LE   -Podiatry consulted   - wound care consulted      BPH  - Continue with flomax      HLD  -holding Lipitor 10mg      HTN   -Continue with Lopressor 12.5mg Daily ( was on 25mg but was changed on DC from [facility])   -Continue lisinopril 40mg and Hydrochlorothiazide      CAD  -Was on ASA 324mg but now hes off since hes been on Eliquis      MCV  -Folate and B12, increased   - started on thiamine         Code Status: full code  FEN: npo   PPX: lovenox held for paracentesis in am   DISPO: Jed Becker MD, PGY-1  06/07/22  9:33 AM    This patient has been staffed and discussed with Andrea Díaz MD.

## 2022-06-07 NOTE — PLAN OF CARE
Problem: Discharge Planning  Goal: Discharge to home or other facility with appropriate resources  Outcome: Progressing  Flowsheets (Taken 6/7/2022 0841)  Discharge to home or other facility with appropriate resources:   Identify barriers to discharge with patient and caregiver   Arrange for needed discharge resources and transportation as appropriate   Identify discharge learning needs (meds, wound care, etc)   Arrange for interpreters to assist at discharge as needed   Refer to discharge planning if patient needs post-hospital services based on physician order or complex needs related to functional status, cognitive ability or social support system     Problem: Pain  Goal: Verbalizes/displays adequate comfort level or baseline comfort level  Outcome: Progressing  Flowsheets  Taken 6/7/2022 1400  Verbalizes/displays adequate comfort level or baseline comfort level:   Encourage patient to monitor pain and request assistance   Assess pain using appropriate pain scale  Taken 6/7/2022 0841  Verbalizes/displays adequate comfort level or baseline comfort level:   Encourage patient to monitor pain and request assistance   Assess pain using appropriate pain scale   Administer analgesics based on type and severity of pain and evaluate response   Implement non-pharmacological measures as appropriate and evaluate response   Consider cultural and social influences on pain and pain management     Problem: Confusion  Goal: Confusion, delirium, dementia, or psychosis is improved or at baseline  Description: INTERVENTIONS:  1. Assess for possible contributors to thought disturbance, including medications, impaired vision or hearing, underlying metabolic abnormalities, dehydration, psychiatric diagnoses, and notify attending LIP  2. [place] high risk fall precautions, as indicated  3. Provide frequent short contacts to provide reality reorientation, refocusing and direction  4.  Decrease environmental stimuli, including noise as appropriate  5. Monitor and intervene to maintain adequate nutrition, hydration, elimination, sleep and activity  6. If unable to ensure safety without constant attention obtain sitter and review sitter guidelines with assigned personnel  7. Initiate Psychosocial CNS and Spiritual Care consult, as indicated  Outcome: Progressing  Flowsheets (Taken 6/7/2022 1368)  Effect of thought disturbance (confusion, delirium, dementia, or psychosis) are managed with adequate functional status:   Assess for contributors to thought disturbance, including medications, impaired vision or hearing, underlying metabolic abnormalities, dehydration, psychiatric diagnoses, notify [facility] high risk fall precautions, as indicated   Provide frequent short contacts to provide reality reorientation, refocusing and direction   Decrease environmental stimuli, including noise as appropriate   Monitor and intervene to maintain adequate nutrition, hydration, elimination, sleep and activity   If unable to ensure safety without constant attention obtain sitter and review sitter guidelines with assigned personnel   Initiate Psychosocial Clinical Nurse Specialist and [facility] consult, as indicated     Problem: Skin/Tissue Integrity  Goal: Absence of new skin breakdown  Description: 1. Monitor for areas of redness and/or skin breakdown  2. Assess vascular access sites hourly  3. Every 4-6 hours minimum:  Change oxygen saturation probe site  4. Every 4-6 hours:  If on nasal continuous positive airway pressure, respiratory therapy assess nares and determine need for appliance change or resting period.   Outcome: Progressing     Problem: Safety - Adult  Goal: Free from fall injury  Outcome: Progressing  Flowsheets (Taken 6/7/2022 0841)  Free From Fall Injury: Instruct family/caregiver on patient safety     Problem: ABCDS Injury Assessment  Goal: Absence of physical injury  Outcome: Progressing  Flowsheets (Taken 6/7/2022 0841)  Absence of Physical Injury: Implement safety measures based on patient assessment     Problem: Nutrition Deficit:  Goal: Optimize nutritional status  6/7/2022 1828 by Abel Husain RN  Outcome: Progressing  6/7/2022 1101 by Sangita Wood RD, LD  Flowsheets (Taken 6/7/2022 1101)  Nutrient intake appropriate for improving, restoring, or maintaining nutritional needs:   Assess nutritional status and recommend course of action   Recommend appropriate diets, oral nutritional supplements, and vitamin/mineral supplements   Order, calculate, and assess calorie counts as needed   Recommend, monitor, and adjust tube feedings and TPN/PPN based on assessed needs   Provide specific nutrition education to patient or family as appropriate

## 2022-06-07 NOTE — PROGRESS NOTES
Physical Therapy  No treatment    Pt is currently off the unit for a procedure. Will continue per plan of care as pt is available.      Arline Winter #[id number]

## 2022-06-08 VITALS
RESPIRATION RATE: 18 BRPM | HEART RATE: 69 BPM | BODY MASS INDEX: 22.93 KG/M2 | DIASTOLIC BLOOD PRESSURE: 82 MMHG | TEMPERATURE: 97.7 F | WEIGHT: 194.22 LBS | HEIGHT: 77 IN | SYSTOLIC BLOOD PRESSURE: 142 MMHG | OXYGEN SATURATION: 100 %

## 2022-06-08 LAB
ALBUMIN FLUID: 0.3 G/DL
ANION GAP SERPL CALCULATED.3IONS-SCNC: 4 MMOL/L (ref 3–16)
BASOPHILS ABSOLUTE: 0 K/UL (ref 0–0.2)
BASOPHILS RELATIVE PERCENT: 0.3 %
BUN BLDV-MCNC: 13 MG/DL (ref 7–20)
CALCIUM SERPL-MCNC: 8.8 MG/DL (ref 8.3–10.6)
CHLORIDE BLD-SCNC: 103 MMOL/L (ref 99–110)
CO2: 40 MMOL/L (ref 21–32)
CREAT SERPL-MCNC: 1 MG/DL (ref 0.8–1.3)
EOSINOPHILS ABSOLUTE: 0 K/UL (ref 0–0.6)
EOSINOPHILS RELATIVE PERCENT: 0.4 %
FLUID TYPE: NORMAL
GFR AFRICAN AMERICAN: >60
GFR NON-AFRICAN AMERICAN: >60
GLUCOSE BLD-MCNC: 81 MG/DL (ref 70–99)
HCT VFR BLD CALC: 29.5 % (ref 40.5–52.5)
HEMOGLOBIN: 9.8 G/DL (ref 13.5–17.5)
LYMPHOCYTES ABSOLUTE: 0.4 K/UL (ref 1–5.1)
LYMPHOCYTES RELATIVE PERCENT: 10.8 %
MAGNESIUM: 1.8 MG/DL (ref 1.8–2.4)
MCH RBC QN AUTO: 33.5 PG (ref 26–34)
MCHC RBC AUTO-ENTMCNC: 33.3 G/DL (ref 31–36)
MCV RBC AUTO: 100.8 FL (ref 80–100)
MONOCYTES ABSOLUTE: 0.2 K/UL (ref 0–1.3)
MONOCYTES RELATIVE PERCENT: 5.9 %
NEUTROPHILS ABSOLUTE: 3.1 K/UL (ref 1.7–7.7)
NEUTROPHILS RELATIVE PERCENT: 82.6 %
PDW BLD-RTO: 15.1 % (ref 12.4–15.4)
PLATELET # BLD: 110 K/UL (ref 135–450)
PMV BLD AUTO: 9.8 FL (ref 5–10.5)
POTASSIUM REFLEX MAGNESIUM: 3.4 MMOL/L (ref 3.5–5.1)
RBC # BLD: 2.93 M/UL (ref 4.2–5.9)
SODIUM BLD-SCNC: 147 MMOL/L (ref 136–145)
WBC # BLD: 3.7 K/UL (ref 4–11)

## 2022-06-08 PROCEDURE — 6360000002 HC RX W HCPCS: Performed by: INTERNAL MEDICINE

## 2022-06-08 PROCEDURE — 97530 THERAPEUTIC ACTIVITIES: CPT

## 2022-06-08 PROCEDURE — 99232 SBSQ HOSP IP/OBS MODERATE 35: CPT | Performed by: NURSE PRACTITIONER

## 2022-06-08 PROCEDURE — 94761 N-INVAS EAR/PLS OXIMETRY MLT: CPT

## 2022-06-08 PROCEDURE — 85025 COMPLETE CBC W/AUTO DIFF WBC: CPT

## 2022-06-08 PROCEDURE — 2700000000 HC OXYGEN THERAPY PER DAY

## 2022-06-08 PROCEDURE — 6370000000 HC RX 637 (ALT 250 FOR IP): Performed by: INTERNAL MEDICINE

## 2022-06-08 PROCEDURE — 83735 ASSAY OF MAGNESIUM: CPT

## 2022-06-08 PROCEDURE — 2580000003 HC RX 258: Performed by: INTERNAL MEDICINE

## 2022-06-08 PROCEDURE — 36415 COLL VENOUS BLD VENIPUNCTURE: CPT

## 2022-06-08 PROCEDURE — 80048 BASIC METABOLIC PNL TOTAL CA: CPT

## 2022-06-08 PROCEDURE — 2580000003 HC RX 258

## 2022-06-08 PROCEDURE — 94640 AIRWAY INHALATION TREATMENT: CPT

## 2022-06-08 PROCEDURE — 6360000002 HC RX W HCPCS: Performed by: STUDENT IN AN ORGANIZED HEALTH CARE EDUCATION/TRAINING PROGRAM

## 2022-06-08 RX ORDER — POTASSIUM CHLORIDE 7.45 MG/ML
10 INJECTION INTRAVENOUS
Status: COMPLETED | OUTPATIENT
Start: 2022-06-08 | End: 2022-06-08

## 2022-06-08 RX ORDER — IPRATROPIUM BROMIDE AND ALBUTEROL SULFATE 2.5; .5 MG/3ML; MG/3ML
1 SOLUTION RESPIRATORY (INHALATION) 2 TIMES DAILY
Status: DISCONTINUED | OUTPATIENT
Start: 2022-06-08 | End: 2022-06-08 | Stop reason: HOSPADM

## 2022-06-08 RX ORDER — THIAMINE HYDROCHLORIDE 100 MG/ML
100 INJECTION, SOLUTION INTRAMUSCULAR; INTRAVENOUS DAILY
Qty: 1 EACH | Refills: 0 | Status: SHIPPED | OUTPATIENT
Start: 2022-06-09

## 2022-06-08 RX ORDER — POTASSIUM CHLORIDE 7.45 MG/ML
20 INJECTION INTRAVENOUS ONCE
Status: DISCONTINUED | OUTPATIENT
Start: 2022-06-08 | End: 2022-06-08

## 2022-06-08 RX ADMIN — DEXTROSE MONOHYDRATE: 50 INJECTION, SOLUTION INTRAVENOUS at 06:12

## 2022-06-08 RX ADMIN — IPRATROPIUM BROMIDE AND ALBUTEROL SULFATE 1 AMPULE: 2.5; .5 SOLUTION RESPIRATORY (INHALATION) at 08:28

## 2022-06-08 RX ADMIN — POTASSIUM CHLORIDE 10 MEQ: 7.46 INJECTION, SOLUTION INTRAVENOUS at 12:31

## 2022-06-08 RX ADMIN — SODIUM CHLORIDE 3000 MG: 900 INJECTION INTRAVENOUS at 05:28

## 2022-06-08 RX ADMIN — SODIUM CHLORIDE, PRESERVATIVE FREE 10 ML: 5 INJECTION INTRAVENOUS at 10:10

## 2022-06-08 RX ADMIN — SODIUM CHLORIDE 3000 MG: 900 INJECTION INTRAVENOUS at 10:20

## 2022-06-08 RX ADMIN — THIAMINE HYDROCHLORIDE 100 MG: 100 INJECTION, SOLUTION INTRAMUSCULAR; INTRAVENOUS at 10:07

## 2022-06-08 RX ADMIN — POTASSIUM CHLORIDE 10 MEQ: 7.46 INJECTION, SOLUTION INTRAVENOUS at 14:12

## 2022-06-08 RX ADMIN — DEXTROSE MONOHYDRATE: 50 INJECTION, SOLUTION INTRAVENOUS at 10:19

## 2022-06-08 ASSESSMENT — PAIN SCALES - PAIN ASSESSMENT IN ADVANCED DEMENTIA (PAINAD)
TOTALSCORE: 1
FACIALEXPRESSION: 1
CONSOLABILITY: 0
BREATHING: 2
TOTALSCORE: 1
BODYLANGUAGE: 0
FACIALEXPRESSION: 0
FACIALEXPRESSION: 0
NEGVOCALIZATION: 1
CONSOLABILITY: 0
BREATHING: 1
CONSOLABILITY: 0
FACIALEXPRESSION: 0
TOTALSCORE: 1
NEGVOCALIZATION: 0
NEGVOCALIZATION: 0
TOTALSCORE: 1
BODYLANGUAGE: 0
CONSOLABILITY: 2
BREATHING: 1
BODYLANGUAGE: 0
NEGVOCALIZATION: 0
BREATHING: 1
BREATHING: 1
TOTALSCORE: 7
CONSOLABILITY: 0
NEGVOCALIZATION: 0
BODYLANGUAGE: 0
BODYLANGUAGE: 1
FACIALEXPRESSION: 0

## 2022-06-08 ASSESSMENT — PAIN SCALES - GENERAL: PAINLEVEL_OUTOF10: 8

## 2022-06-08 ASSESSMENT — PAIN DESCRIPTION - PAIN TYPE: TYPE: ACUTE PAIN

## 2022-06-08 ASSESSMENT — PAIN - FUNCTIONAL ASSESSMENT: PAIN_FUNCTIONAL_ASSESSMENT: PREVENTS OR INTERFERES SOME ACTIVE ACTIVITIES AND ADLS

## 2022-06-08 ASSESSMENT — PAIN DESCRIPTION - DESCRIPTORS: DESCRIPTORS: BURNING;SORE

## 2022-06-08 ASSESSMENT — PAIN DESCRIPTION - ORIENTATION: ORIENTATION: RIGHT;MID

## 2022-06-08 ASSESSMENT — PAIN SCALES - WONG BAKER: WONGBAKER_NUMERICALRESPONSE: 6

## 2022-06-08 ASSESSMENT — PAIN DESCRIPTION - ONSET: ONSET: SUDDEN

## 2022-06-08 ASSESSMENT — PAIN DESCRIPTION - FREQUENCY: FREQUENCY: CONTINUOUS

## 2022-06-08 ASSESSMENT — PAIN DESCRIPTION - LOCATION: LOCATION: ARM

## 2022-06-08 NOTE — PROGRESS NOTES
Speech Language Pathology  Attempt    Chart reviewed, d/w Yumiko Paz from Palliative care and nursing student. Attempted to re-assess pt, however pt asleep, did not respond to verbal or tactile stim. Will follow up as pt able and schedule allows      Mirza Stewart M.S./[facility]-SLP #[id number]  Pg.  # [phone]

## 2022-06-08 NOTE — DISCHARGE INSTR - COC
Continuity of Care Form    Patient Name: Vickie Connelly   :  1940  MRN:  [id number]    Admit date:  2022  Discharge date:  ***    Code Status Order: Limited   Advance Directives:      Admitting Physician:  Moshe Guzman MD  PCP: Og Greco MD    Discharging Nurse: [facility] Unit/Room#: 1297/2272-56  Discharging Unit Phone Number: ***    Emergency Contact:   Extended Emergency Contact Information  Primary Emergency Contact: [facility] [address] [address] Phone: [phone]  Relation: Child  Secondary Emergency Contact: Blake Antunez  [place] Phone: [phone]  Relation: Child    Past Surgical History:  History reviewed. No pertinent surgical history.     Immunization History:   Immunization History   Administered Date(s) Administered    Influenza Virus Vaccine 10/25/2017    Pneumococcal Conjugate 7-valent (Bettyjo Win) 10/25/2017       Active Problems:  Patient Active Problem List   Diagnosis Code    Syncope and collapse R55    HTN (hypertension) I10    Stage 3 chronic kidney disease (HCC) N18.30    CAD (coronary artery disease) I25.10    Hospital-acquired bacterial pneumonia J15.9    FTT (failure to thrive) in adult R62.7       Isolation/Infection:   Isolation            No Isolation          Patient Infection Status       None to display            Nurse Assessment:  Last Vital Signs: BP (!) 116/57   Pulse 65   Temp 97.9 °F (36.6 °C) (Oral)   Resp 20   Ht 6' 5\" (1.956 m)   Wt 194 lb 3.6 oz (88.1 kg)   SpO2 97%   BMI 23.03 kg/m²     Last documented pain score (0-10 scale): Pain Level: 8  Last Weight:   Wt Readings from Last 1 Encounters:   22 194 lb 3.6 oz (88.1 kg)     Mental Status:  {IP PT MENTAL STATUS:}    IV Access:  {[facility] IV ACCESS:118397154}    Nursing Mobility/ADLs:  Walking   {CHP DME WUZY:427571405}  Transfer  {CHP DME FYSE:666753600}  Bathing  {CHP DME ZKTA:307380020}  Dressing  {CHP DME XXFE:229401444}  Toileting  {CHP DME NLME:108515910}  Feeding  {CHP DME UNMZ:203947581}  Med Admin  {P DME CDWO:718150868}  Med Delivery   { [facility] MED Delivery:305789715}    Wound Care Documentation and Therapy:  Wound 06/01/22 Sacrum (Active)   Wound Image   06/06/22 1517   Wound Etiology Pressure Stage 2 06/08/22 1215   Dressing Status Clean;Dry; Intact 06/08/22 1215   Wound Cleansed Soap and water 06/07/22 0841   Dressing/Treatment Foam 06/08/22 1215   Dressing Change Due 06/09/22 06/08/22 1215   Wound Length (cm) 1 cm 06/06/22 1517   Wound Width (cm) 3 cm 06/06/22 1517   Wound Depth (cm) 0.1 cm 06/06/22 1517   Wound Surface Area (cm^2) 3 cm^2 06/06/22 1517   Wound Volume (cm^3) 0.3 cm^3 06/06/22 1517   Wound Assessment Pink/red 06/06/22 1517   Drainage Amount Scant 06/06/22 1517   Drainage Description Serosanguinous 06/06/22 1517   Odor None 06/08/22 1215   Promise-wound Assessment Dry/flaky;Fragile; Intact 06/06/22 1517   Margins Attached edges; Defined edges 06/06/22 1517   Number of days: 6        Elimination:  Continence:    Bowel: {YES / MM:57994}  Bladder: {YES / QL:63761}  Urinary Catheter: {Urinary Catheter:425569744}   Colostomy/Ileostomy/Ileal Conduit: {YES / IJ:25088}       Date of Last BM: ***    Intake/Output Summary (Last 24 hours) at 6/8/2022 1402  Last data filed at 6/7/2022 2355  Gross per 24 hour   Intake --   Output 450 ml   Net -450 ml     I/O last 3 completed shifts:  In: -   Out: 1350 [AZAUD:4008]    Safety Concerns:     [address] [employer] Safety Concerns:937458059}    Impairments/Disabilities:      [address] [employer] Impairments/Disabilities:793179574}    Nutrition Therapy:  Current Nutrition Therapy:   [address] [employer] Diet List:033275775}    Routes of Feeding: {CHP DME Other Feedings:054356554}  Liquids: {Slp liquid thickness:30549}  Daily Fluid Restriction: {CHP DME Yes amt example:369893220}  Last Modified Barium Swallow with Video (Video Swallowing Test): {Done Not Done UFUQ:338792574}    Treatments at the Time of Hospital Discharge:   Respiratory Treatments: ***  Oxygen Therapy:  {Therapy; copd oxygen:37480}  Ventilator:    {MH CC Vent CIRM:503172490}    Rehab Therapies: {THERAPEUTIC INTERVENTION:2425199282}  Weight Bearing Status/Restrictions: 50Keara BARAJAS Weight Bearin}  Other Medical Equipment (for information only, NOT a DME order):  {EQUIPMENT:124235201}  Other Treatments: ***    Patient's personal belongings (please select all that are sent with patient):  {CHP DME Belongings:750372722}    RN SIGNATURE:  {Esignature:777323708}    CASE MANAGEMENT/SOCIAL WORK SECTION    Inpatient Status Date: 22    Readmission Risk Assessment Score:  Readmission Risk              Risk of Unplanned Readmission:  13           Discharging to Facility/ Agency   [facility] [address] [facility] [address] [place] [address] [place] [address]        [phone]      / signature: Electronically signed by VICTORINA Miller, LSW on 22 at 2:03 PM EDT    PHYSICIAN SECTION    Prognosis: {Prognosis:4305353898}    Condition at Discharge: 50Keara Crawley Patient Condition:968124965}    Rehab Potential (if transferring to Rehab): {Prognosis:3106100012}    Recommended Labs or Other Treatments After Discharge: ***    Physician Certification: I certify the above information and transfer of Shraddha Kruger  is necessary for the continuing treatment of the diagnosis listed and that he requires {Admit to Appropriate Level of Care:74435} for {GREATER/LESS:748328693} 30 days.      Update Admission H&P: {CHP DME Changes in YJZEL:235234959}    PHYSICIAN SIGNATURE:  {Esignature:055740060}

## 2022-06-08 NOTE — PROGRESS NOTES
dc.  Treatment Diagnosis: impaired ADLs/transfers and decreased functional activity tolerance  REQUIRES OT FOLLOW-UP: Yes  Activity Tolerance  Activity Tolerance: Patient limited by fatigue;Treatment limited secondary to decreased cognition        Plan   Plan  Times per Week: 2-5  Times per Day: Daily  Current Treatment Recommendations: Strengthening,ROM,Balance training,Functional mobility training,Endurance training,Safety education & training,Cognitive reorientation,Self-Care / ADL     Restrictions  Position Activity Restriction  Other position/activity restrictions: up with assist    Subjective   General  Chart Reviewed: Yes  Additional Pertinent Hx: 79 y.o. M presents w/ AMS. Hospital Course: CT Head: (-). PMH: HLD, HTN , CAD s/p cardiac cath 2012 with stents, GERD, PVD  Family / Caregiver Present: No  Referring Practitioner: Pako Ward MD  Diagnosis: Hospital Acquired Bacterial Pneumonia  Subjective  Subjective: In bed, minimal verbalizations, sleeping soundly, in session \"I'm done, I'm going to lay back down\"     Social/Functional History  Social/Functional History  Type of Home: Facility (unknown if LTC or SNF?)  Home Equipment: Cane,Walker, 4 wheeled  Has the patient had two or more falls in the past year or any fall with injury in the past year?: Yes  Receives Help From:  (reports dtr visits)  ADL Assistance: Independent  Homemaking Assistance: Independent (reports he does his own laundry and meals)  Ambulation Assistance: Independent (using 4 wh walker or cane)  Transfer Assistance: Independent  Additional Comments: pt is poor historian. pt reports he is from home and IND prior to admission doing all household tasks and ambulating with 4WW or cane. although per chart review pt from nursing home (unknown length of stay).  unknown if pt is currently ambulatory or extent of what the facility is currently assisting pt with       Objective   Heart Rate: 88  Heart Rate Source: Monitor  BP: (!) 96/59  BP Location: Left upper arm  BP Method: Automatic  Patient Position: Lying right side  MAP (Calculated): 71.33  Resp: 20  SpO2: 95 %  O2 Device: Nasal cannula             Safety Devices  Type of Devices: Left in bed;Call light within reach; Chair alarm in place;Gait belt;Bed alarm in place; Patient at risk for falls;Nurse notified; All fall risk precautions in place  Bed Mobility Training  Bed Mobility Training: Yes  Supine to Sit: Maximum assistance;Assist X2  Sit to Supine: Maximum assistance (LE)  Balance  Sitting: Impaired  Sitting - Static:  (propped on R elbow)  Transfer Training  Transfer Training: No (declined)        ADL  Grooming:  (declined)  UE Dressing: Moderate assistance  LE Dressing: Dependent/Total  Toileting:  (purewick and brief in place)                 Cognition  Overall Cognitive Status: Exceptions  Arousal/Alertness: Delayed responses to stimuli  Following Commands: Follows one step commands with repetition; Follows one step commands with increased time; Inconsistently follows commands  Attention Span: Difficulty attending to directions  Memory: Decreased recall of biographical Information;Decreased recall of recent events;Decreased short term memory  Safety Judgement: Decreased awareness of need for assistance;Decreased awareness of need for safety  Problem Solving: Decreased awareness of errors  Insights: Decreased awareness of deficits  Initiation: Requires cues for all                  Education Given To: Patient  Education Provided: Role of Therapy  Education Method: Verbal  Barriers to Learning: Cognition  Education Outcome: Continued education needed                        AM-PAC Score        AM-[facility] Inpatient Daily Activity Raw Score: 8 (06/08/22 1006)  AM-PAC Inpatient ADL T-Scale Score : 22.86 (06/08/22 1006)  ADL Inpatient CMS 0-100% Score: 85.69 (06/08/22 1006)  ADL Inpatient CMS G-Code Modifier : CM (06/08/22 1006)    Goals  Short Term Goals  Time Frame for Short term goals: Discharge  Short Term Goal 1: unsupported sitting at EOB w/ Min A for sitting balance  Short Term Goal 2: simple grooming tasks w/ setup, Min A  Short Term Goal 3: improve sit-stand transfer to Min A of 2  Patient Goals   Patient goals : no goal stated       Therapy Time   Individual Concurrent Group Co-treatment   Time In 0935         Time Out 0946         Minutes 11              Timed Code Treatment Minutes:   11    Total Treatment Minutes:  Damion Brothers OT

## 2022-06-08 NOTE — PROGRESS NOTES
Assessment  80 yo M with history of COPD, PE, HTN, HLD, CAD admitted with FTT and concern for aspiration pneumonia.  High risk for ongoing aspiration per SLP and gastrostomy recommended.  On evaluation noted to have new large volume ascites as well as persistent biliary dilation which has been noted since outside hospital stay 4/2022. Zoila Bucio has had progressive rise in alk phos and now with hyperbilirubinemia. Planned gastrostomy canceled due to presence of ascites. Underwent paracentesis with 1.3L ascites removed. Cx negative with no signs of SBP and high SAAG ascites noted. Cytology pending. MRCP 6/7/22 with ongoing bile duct and pancreatic duct dilation, GB sludge, moderate ascites, polycystic kidneys, pleural effusions, and hepatic mass suspected to represent benign hemangioma. No cause of biliary and pancreatic duct dilation noted. Patient has withdrawn his nasogastric feeding tube which was placed last night. Spoke with patient's daughter who would like to proceed with ERCP for biliary decompression. Will plan for concurrent EGD given dysphagia and possible EUS with unclear cause of duct dilation. Plan:  -Ensure NPO after midnight if FT replaced.   -Hold Lovenox   -ERCP/EGD +/- EUS tomorrow   -Awaiting cytology from ascites   -If no obstructive pathology seen on endoscopic evaluation, recommend further evaluation for neurologic causes of dysphagia. Subjective: We are following for ascites, dysphagia, and biliary obstruction. Patient provides limited responses this morning to questioning. Picking at monitoring wires. Denies abdominal pain, nausea, or vomiting. Had NGT placed last night which he apparently withdrew. Objective:    Review of Systems:    Constitutional: Negative for fever, chills, and unexpected weight change. HENT: +Trouble swallowing  Respiratory: Negative for cough, chest tightness and shortness of breath.     Cardiovascular: Negative for chest pain  Gastrointestinal: see HPI  Musculoskeletal: Negative for unusual arthralgias. Skin: Negative for rash. Scheduled Meds:   ipratropium-albuterol  1 ampule Inhalation BID    potassium chloride  10 mEq IntraVENous Q1H    ampicillin-sulbactam  3,000 mg IntraVENous Q6H    sodium chloride flush  5-40 mL IntraVENous 2 times per day    [Held by provider] aspirin  325 mg Oral Daily    [Held by provider] metoprolol succinate  12.5 mg Oral Daily    [Held by provider] atorvastatin  10 mg Oral Daily    [Held by provider] tamsulosin  0.4 mg Oral Daily    [Held by provider] lisinopril  40 mg Oral Daily    thiamine  100 mg IntraVENous Daily    [Held by provider] enoxaparin  1 mg/kg SubCUTAneous BID    [Held by provider] melatonin  10 mg Oral Nightly     Continuous Infusions:   dextrose 75 mL/hr at 06/08/22 1019    dextrose      sodium chloride         Vitals:  BP (!) 116/57   Pulse 65   Temp 97.9 °F (36.6 °C) (Oral)   Resp 20   Ht 6' 5\" (1.956 m)   Wt 194 lb 3.6 oz (88.1 kg)   SpO2 97%   BMI 23.03 kg/m²     Exam:  General:  comfortable, thin AAM.  Intermittent agitation. Heent: There is mild scleral icterus. Cardiovascular: The heart is regular rate and rhythm. Respiratory:  The patient's breathing is non-labored with normal chest wall excursion and normal muscle movement.    Abdomen:  The abdomen is nondistended, soft, and nontender. There are active bowel sounds. There are no masses or organomegaly  Rectal:  deferred   Neurological:  Patient is alert but withdrawn    Labs and Imaging:  I reviewed the labs and imaging results from last 24 hours. Recent Labs     06/06/22  0630 06/07/22  0736 06/08/22  0833   HGB 9.2* 10.3* 9.8*   WBC 4.3 3.9* 3.7*      Results for Leelee Claudio (MRN [id number]) as of 6/8/2022 12:38   Ref.  Range 6/7/2022 11:27 6/7/2022 11:29   Source + CELL CT/DIFF-BF Unknown Ascitic Fluid    Appearance, Fluid Unknown Clear    Color, Fluid Unknown Yellow    Fluid Type Unknown  Ascitic Fluid   Glucose, Fluid Latest Ref Range: Not Established mg/dL  112.0   LD, Fluid Latest Ref Range: Not Established U/L  77   RBC, Fluid Latest Units: /cumm 1,000    Lymphocytes, Body Fluid Latest Units: % 57    Monocyte Count, Fluid Latest Units: % 29    Neutrophil Count, Fluid Latest Units: % 1    Nucl Cell, Fluid Latest Units: /cumm 73    Albumin, Fluid Latest Ref Range: Not Established g/dL  0.3   Mesothelial, Fluid Latest Units: % 5    Clot Eval. Unknown see below    Number of Cells Counted Fluid Unknown 100          MRCP 6/7/22: Motion degraded examination with dilatation of the extrahepatic biliary tree similar to outside reports from April 2022 with the common bile duct measuring up to 18 mm. No evidence of biliary calculi or mass on this motion degraded examination.       Associated dilatation of the pancreatic duct again similar to outside description without discrete pancreatic mass on this motion degraded exam.       Complex fluid within the gallbladder, likely sludge without gallbladder distention or surrounding inflammatory change.       Moderate amount of ascites similar to recent CT.       Polycystic kidneys with both simple and benign appearing hyperdense/hemorrhagic cyst.       Small bilateral pleural effusions with associated bibasilar atelectasis.       4.6 cm hemangioma within the lateral left hepatic lobe.          Mercedes Gonsalez MD, MD  June 8, 2022

## 2022-06-08 NOTE — RT PROTOCOL NOTE
RT Nebulizer Bronchodilator Protocol Note    There is a bronchodilator order in the chart from a provider indicating to follow the RT Bronchodilator Protocol and there is an Initiate RT Bronchodilator Protocol order as well (see protocol at bottom of note). CXR Findings:  No results found. The findings from the last RT Protocol Assessment were as follows:  Smoking: Chronic pulmonary disease  Respiratory Pattern: Dyspnea on exertion or RR 21-25 bpm  Breath Sounds: Slightly diminished and/or crackles  Cough: Strong, spontaneous, non-productive  Indication for Bronchodilator Therapy: Decreased or absent breath sounds  Bronchodilator Assessment Score: 6    Aerosolized bronchodilator medication orders have been revised according to the RT Nebulizer Bronchodilator Protocol below. Respiratory Therapist to perform RT Therapy Protocol Assessment initially then follow the protocol. Repeat RT Therapy Protocol Assessment PRN for score 0-3 or on second treatment, BID, and PRN for scores above 3. No Indications - adjust the frequency to every 6 hours PRN wheezing or bronchospasm, if no treatments needed after 48 hours then discontinue using Per Protocol order mode. If indication present, adjust the RT bronchodilator orders based on the Bronchodilator Assessment Score as indicated below. If a patient is on this medication at home then do not decrease Frequency below that used at home. 0-3 - enter or revise RT bronchodilator order(s) to equivalent RT Bronchodilator order with Frequency of every 4 hours PRN for wheezing or increased work of breathing using Per Protocol order mode. 4-6 - enter or revise RT Bronchodilator order(s) to two equivalent RT bronchodilator orders with one order with BID Frequency and one order with Frequency of every 4 hours PRN wheezing or increased work of breathing using Per Protocol order mode.          7-10 - enter or revise RT Bronchodilator order(s) to two equivalent RT bronchodilator orders with one order with TID Frequency and one order with Frequency of every 4 hours PRN wheezing or increased work of breathing using Per Protocol order mode. 11-13 - enter or revise RT Bronchodilator order(s) to one equivalent RT bronchodilator order with QID Frequency and an Albuterol order with Frequency of every 4 hours PRN wheezing or increased work of breathing using Per Protocol order mode. Greater than 13 - enter or revise RT Bronchodilator order(s) to one equivalent RT bronchodilator order with every 4 hours Frequency and an Albuterol order with Frequency of every 2 hours PRN wheezing or increased work of breathing using Per Protocol order mode. RT to enter RT Home Evaluation for COPD & MDI Assessment order using Per Protocol order mode.     Electronically signed by Todd Lancaster RCP on 6/8/2022 at 8:31 AM

## 2022-06-08 NOTE — PROGRESS NOTES
Physical Therapy    Daily Treatment Note      Discharge Recommendations:  Dario Mcelroy scored a 6/24 on the AM-PAC short mobility form. Current research shows that an AM-PAC score of 17 or less is typically not associated with a discharge to the patient's home setting. Based on the patient's AM-PAC score and their current functional mobility deficits, it is recommended that the patient have 3-5 sessions per week of Physical Therapy at d/c to increase the patient's independence. Please see assessment section for further patient specific details. Assessment:  Pt with minimal activity today. Pt resistive to EOB activity with poor cooperation. Pt needing 2 person assist for mobility. Plan is for SNF. Will follow. Equipment Needs:  Defer    Chart Reviewed: Yes     Other position/activity restrictions: up with assist   Additional Pertinent Hx: pt is an 80 yo male presenting with AMS from nursing home      Diagnosis: hospital aquired bacterial pneumonia   Treatment Diagnosis: dec functional mobility      Subjective:  Pt found side lying in bed with covers overhead. Pt difficult to arouse. \"I can't. Leave me alone. Come back later. \"      Pain:  Denies      Objective:    Bed mobility  Supine to sit: Max A x2 (log roll, HOB raised, pt resistive)  Sit to Supine:  Max A (for LEs)    Transfers  Sit to stand:  Unable to assess  Stand to sit:  Unable to assess  Bed <> chair:  Unable to assess     Ambulation  No    Exercises  Attempted LAQ while sitting EOB, pt refused    Neuro/balance  Pt sat EOB x5 min total with Max A, progressing to Min A. Pt propped on R elbow. Pt resistive to EOB activity. \"Leave me alone. \"    Patient Education  Role of PT. No learning indicated. Recommend ongoing education. Safety Devices  Pt left with needs in reach, supine in bed on R side, alarm in place and RN aware. Camera in room.       AM-PAC score  AM-PAC Inpatient Mobility Raw Score : 6  AM-PAC Inpatient T-Scale Score : 23.55  Mobility Inpatient CMS 0-100% Score: 100  Mobility Inpatient CMS G-Code Modifier : CN    Goals:  Goals not met this treatment, continue with current goals. Time Frame for Short term goals: by dc  Short term goal 1: pt will perform bed mobility with min A   Short term goal 2: pt will perform sit <> stand transfers at [address] [place] [address] with mod A   Short term goal 3: pt will perform bed <> chair transfers with LRAD and mod A    Plan:  2-5x/week  Current Treatment Recommendations: Strengthening,ROM,Balance training,Transfer training,Functional mobility training,Neuromuscular re-education,Safety education & training,Home exercise program,Patient/Caregiver education & training,Therapeutic activities,Gait training    Therapy Time    Individual  Concurrent  Group  Co-treatment    Time In  0935            Time Out  0946            Minutes  11            Timed Code Treatment Minutes:  11  Total Treatment Minutes:  11      If patient is discharged prior to next treatment, this note will serve as the discharge summary.     Grayson Hobbs, PT

## 2022-06-08 NOTE — PROGRESS NOTES
Palliative Medicine Progress Note    Admit Date: 6/1/2022  Hospital Day:  Hospital Day: 8     CC: Dysphagia  HPI: HPI PMH of HLD, HTN , CAD s/p cardiac cath 2012 with stents, prostate cancer, GERD, PVD, here with AMS and aspiration pneumonia and ascites. Recommendations:     Pt is resting comfortably this morning but keeps his blanket over his head and does not interact with me. Called POA dtr Zakiya Ledesma and gave her an update on him pulling out his NG tube. Discussed goals of care regarding nutrition since PEG is contraindicated due to ascites, and since pt pulled out NG tube. Zakiya Ledesma would like to pursue comfort care and hospice. Sedating or restraining pt to feed him via NG tube would not help his quality of life or comfort, would not be a long term plan, and would not be consistent with pt's wishes. Zakiya Ledesma will meet with hospice RN when she comes to the hospital this afternoon. D/w HOC RN Ju Jacobs RN, and Dr. Paddy Herman. 1. Goals of Care/Advanced Care planning/Code status: DNR/DNI (Limited- no to all interventions). Daughter/POA Zakiya Ledesma wants to proceed with hospice. 2. Pain: no signs of pain this morning. 3. SOB: denies sob and is breathing comfortably on 3 L oxygen. He's been on unasyn since 6/4 for aspiration. Noted periods of apnea during visit this morning. 4. Dysphagia: NPO per SLP recommendations. He is not expressing that he wants to eat for me, but dtr says he wants to eat when she talks with him. With known aspiration risk, pt can eat/drink for quality of life with hospice.   5. Disposition: d/c to [facility] inpatient unit vs [facility] with hospice likely later today    Subjective:     Scheduled Meds:   ipratropium-albuterol  1 ampule Inhalation BID    ampicillin-sulbactam  3,000 mg IntraVENous Q6H    sodium chloride flush  5-40 mL IntraVENous 2 times per day    [Held by provider] aspirin  325 mg Oral Daily    [Held by provider] metoprolol succinate  12.5 mg Oral Daily    [Held by provider] atorvastatin  10 mg Oral Daily    [Held by provider] tamsulosin  0.4 mg Oral Daily    [Held by provider] lisinopril  40 mg Oral Daily    thiamine  100 mg IntraVENous Daily    [Held by provider] enoxaparin  1 mg/kg SubCUTAneous BID    [Held by provider] melatonin  10 mg Oral Nightly       Continuous Infusions:   dextrose 75 mL/hr at 06/08/22 1019    dextrose      sodium chloride         PRN Meds:glucose, dextrose bolus **OR** dextrose bolus, glucagon (rDNA), dextrose, ipratropium-albuterol, sodium chloride flush, sodium chloride, ondansetron **OR** ondansetron, polyethylene glycol, acetaminophen **OR** acetaminophen, hydrALAZINE    Review of Systems. Review of Systems - unable to obtain due to pt not speaking to me. Performance status 30%    Objective:     Patient Vitals for the past 8 hrs:   BP Temp Temp src Pulse Resp SpO2   06/08/22 0755 (!) 96/59 97.5 °F (36.4 °C) Oral 88 20 95 %   06/08/22 0410 (!) 142/80 97.8 °F (36.6 °C) Oral 74 21 98 %     I/O last 3 completed shifts:  In: -   Out: 1350 [Urine:1350]  No intake/output data recorded. Physical Exam  Constitutional:       General: He is not in acute distress. HENT:      Head: Normocephalic and atraumatic. Pulmonary:      Effort: Pulmonary effort is normal.      Comments: Periods of apnea noted  Skin:     General: Skin is warm and dry. Neurological:      Comments: Did not speak to me      Pt refused full exam. He keeps his blankets pulled up over himself. WBC/Hgb/Hct/Plts:  3.7/9.8/29.5/110 (06/08 3158)           Assessment:     Principal Problem:    Hospital-acquired bacterial pneumonia  Active Problems:    FTT (failure to thrive) in adult  Resolved Problems:    * No resolved hospital problems.  *    Pt 1010, Family [phone]  Time spent with patient and/or family: 8  Time reviewing records: 5  Time communicating with providers: 10    A total of 25 minutes spent with the patient and family on unit greater than 50% face to face time in counseling regarding palliative care and goals of care for the patient.      Gem Fowler NP  [address]

## 2022-06-08 NOTE — PROGRESS NOTES
[facility]:  Received update earlier today from [address] regarding pt. Status and that family is interested in pursuing hospice services. Met with daughter/POA Chelly Bellamy and son in law at bedside. Pt upset about not being able to eat and drink and when daughter explains to him that when he does eat and drink it goes into his lungs and makes it hard for him to breathe he states \"I don't care if it makes me die\". Plan is to move patient to the [facility] this evening with [facility] transporting him at 530 pm.      SW and RN updated. RN requesting discharge order and that MD sign DNR CC form on patient chart prior to discharge.       Thank you,  Nikkie Griffiths RN [address] [facility] [phone]

## 2022-06-08 NOTE — CARE COORDINATION
Case Management Assessment            Discharge Note                    Date / Time of Note: 6/8/2022 1:45 PM                  Discharge Note Completed by: VICTORINA Valentin, BELLOW    Patient Name: Joni Dinero   YOB: 1940  Diagnosis: Altered mental status, unspecified altered mental status type [R41.82]  Hospital-acquired bacterial pneumonia [J15.9]  Pneumonia of lower lobe due to infectious organism, unspecified laterality [J18.9]   Date / Time: 6/1/2022  6:49 PM    Current PCP: Zo Garnica MD  Clinic patient: No    Hospitalization in the last 30 days: No    Advance Directives:  Code Status: Limited  [place] DNR form completed and on chart: Yes    Financial:  Payor: [address] [place] [address], Ne / Plan: [facility] HMO / Product Type: *No Product type* /      Pharmacy:  No Pharmacies [id number] [employer] medications?: Potential Assistance Purchasing Medications: No  Assistance provided by Case Management: None at this time    Does patient want to participate in local refill/ meds to beds program?:      Meds To [facility] Rules:  1. Can ONLY be done Monday- Friday between 8:30am-5pm  2. Prescription(s) must be in pharmacy by 3pm to be filled same day  3. Copy of patient's insurance/ prescription drug card and patient face sheet must be sent along with the prescription(s)  4. Cost of Rx cannot be added to hospital bill. If financial assistance is needed, please contact unit  or ;  or  CANNOT provide pharmacy voucher for patients co-pays  5.  Patients can then  the prescription on their way out of the hospital at discharge, or pharmacy can deliver to the bedside if staff is available. (payment due at time of pick-up or delivery - cash, check, or card accepted)     Able to afford home medications/ co-pay costs: Yes    ADLS:  Current PT AM-PAC Score: 6 /24  Current OT AM-PAC Score: 8 /24      DISCHARGE Disposition: Inpatient Hospice Unit: [facility]     LOC at discharge: Not Applicable  [place] Completed: Yes    Notification completed in HENS/PAS?:  Not Applicable    IMM Completed:   Not Indicated    Transportation:  Transportation PLAN for discharge: EMS transportation   Mode of Transport: Ambulance stretcher - BLS  Reason for medical transport: Bed confined: Meets the following criteria 1) unable to get out of bed without assistance or ambulate, 2) unable to safely sit up in a wheelchair, 3) unable to maintain erect seating position in a chair for time needed for transport  Name of Transport Company: [employer]  Phone: [phone]  Time of Transport: 5:30pm    Transport form completed: Yes    Home Oxygen and Respiratory Equipment:  Oxygen needed at discharge?: Yes    Hospice Services:  Location: Inpatient Unit  Agency: [facility]  Phone: [phone]    Consents signed: Yes    Referrals made at [facility] for outpatient continued care:  Not Applicable    Additional CM Notes:  Pt will DC [facility] inpatient hosp[facility]. [employer] with [facility] met with pt and step-daughter, and coordinated transportation. The Plan for Transition of Care is related to the following treatment goals of Altered mental status, unspecified altered mental status type [R41.82]  Hospital-acquired bacterial pneumonia [J15.9]  Pneumonia of lower lobe due to infectious organism, unspecified laterality [J18.9]    The Patient and/or patient representative Nata Anderson and his family were provided with a choice of provider and agrees with the discharge plan Yes    Freedom of choice list was provided with basic dialogue that supports the patient's individualized plan of care/goals and shares the quality data associated with the providers.  Yes    Care Transitions patient: No    VICTORINA áSnchez, [facility] [employer]  Case Management Department  Ph: [phone]  Fax: [phone]

## 2022-06-08 NOTE — PROGRESS NOTES
Internal Medicine Progress Note    Admit Date: 6/1/2022    Diet: Diet NPO    CC:AMS    Interval history: pt seen and examined at bedside. CARISSANISREEN Pt pulled out his kirkland this am, which was placed yesterday evening. Remains on Unasyn. Vital stable, afebrile. Medications:     Scheduled Meds:   ipratropium-albuterol  1 ampule Inhalation BID    ampicillin-sulbactam  3,000 mg IntraVENous Q6H    sodium chloride flush  5-40 mL IntraVENous 2 times per day    [Held by provider] aspirin  325 mg Oral Daily    [Held by provider] metoprolol succinate  12.5 mg Oral Daily    [Held by provider] atorvastatin  10 mg Oral Daily    [Held by provider] tamsulosin  0.4 mg Oral Daily    [Held by provider] lisinopril  40 mg Oral Daily    thiamine  100 mg IntraVENous Daily    [Held by provider] enoxaparin  1 mg/kg SubCUTAneous BID    [Held by provider] melatonin  10 mg Oral Nightly     Continuous Infusions:   dextrose 75 mL/hr at 06/08/22 0612    dextrose      sodium chloride       PRN Meds:glucose, dextrose bolus **OR** dextrose bolus, glucagon (rDNA), dextrose, ipratropium-albuterol, sodium chloride flush, sodium chloride, ondansetron **OR** ondansetron, polyethylene glycol, acetaminophen **OR** acetaminophen, hydrALAZINE    Objective:   Vitals:   T-max:  Patient Vitals for the past 8 hrs:   BP Temp Temp src Pulse Resp SpO2   06/08/22 0755 (!) 96/59 97.5 °F (36.4 °C) Oral 88 20 95 %   06/08/22 0410 (!) 142/80 97.8 °F (36.6 °C) Oral 74 21 98 %       Intake/Output Summary (Last 24 hours) at 6/8/2022 0852  Last data filed at 6/7/2022 2355  Gross per 24 hour   Intake --   Output 450 ml   Net -450 ml       Physical Exam  Constitutional:       General: He is not in acute distress. Comments: Alert, verbal, intermittently following commands. Speech more comprehensible    HENT:      Head: Normocephalic and atraumatic. Nose: Nose normal.   Eyes:      Extraocular Movements: Extraocular movements intact. Conjunctiva/sclera: Conjunctivae normal.      Pupils: Pupils are equal, round, and reactive to light. Cardiovascular:      Rate and Rhythm: Normal rate and regular rhythm. Pulses: Normal pulses. Heart sounds: Normal heart sounds. Pulmonary:      Effort: Pulmonary effort is normal.      Breath sounds: Normal breath sounds. Comments: Coarse breath sounds bilateral   Abdominal:      General: Abdomen is flat. Bowel sounds are normal. There is distension. Palpations: Abdomen is soft. Skin:     General: Skin is warm. Neurological:      Comments: Alert, intermittently following commands. Speech getting more comprehensible compared to OA. Psychiatric:      Comments: Unable to assess            LABS:    CBC:   Recent Labs     06/06/22  0630 06/07/22  0736   WBC 4.3 3.9*   HGB 9.2* 10.3*   HCT 28.2* 31.0*   * 121*   .5* 100.9*     Renal:    Recent Labs     06/06/22  0630 06/07/22  0736   * 149*   K 3.4* 3.2*    103   CO2 40* 42*   BUN 17 14   CREATININE 1.0 1.0   GLUCOSE 76 98   CALCIUM 9.1 9.0   MG 1.90 1.80   ANIONGAP 6 4     Hepatic:   No results for input(s): AST, ALT, BILITOT, BILIDIR, PROT, LABALBU, ALKPHOS in the last 72 hours. Troponin:   No results for input(s): TROPONINI in the last 72 hours. BNP: No results for input(s): BNP in the last 72 hours. Lipids: No results for input(s): CHOL, HDL in the last 72 hours. Invalid input(s): LDLCALCU, TRIGLYCERIDE  ABGs:  No results for input(s): PHART, ITN9YRO, PO2ART, GSU6EOW, BEART, THGBART, D8DIBQLP, IRF1OXO in the last 72 hours. INR:   Recent Labs     06/05/22  1421   INR 1.13     Lactate: No results for input(s): LACTATE in the last 72 hours.   Cultures:  -----------------------------------------------------------------  RAD:   MRI ABDOMEN W WO CONTRAST MRCP   Final Result      Motion degraded examination with dilatation of the extrahepatic biliary tree similar to outside reports from April 2022 with the common bile duct measuring up to 18 mm. No evidence of biliary calculi or mass on this motion degraded examination. Associated dilatation of the pancreatic duct again similar to outside description without discrete pancreatic mass on this motion degraded exam.      Complex fluid within the gallbladder, likely sludge without gallbladder distention or surrounding inflammatory change. Moderate amount of ascites similar to recent CT. Polycystic kidneys with both simple and benign appearing hyperdense/hemorrhagic cyst.      Small bilateral pleural effusions with associated bibasilar atelectasis. 4.6 cm hemangioma within the lateral left hepatic lobe. XR ABDOMEN (KUB) (SINGLE AP VIEW)   Final Result   Tip of the enteric tube overlies left upper quadrant. US GUIDED PARACENTESIS   Final Result   1. Technically successful ultrasound-guided diagnostic paracentesis with 5 Divehi catheter in right flank as described. CT ABDOMEN PELVIS W IV CONTRAST Additional Contrast? None   Final Result      Bilateral pleural effusions with bibasilar airspace disease. Extensive ascites. Diffuse intra and extra hepatic biliary duct dilatation may indicate biliary ductal obstruction. Sludge seen within gallbladder lumen. Multiple renal cysts which have increased in size and number since prior study. XR CHEST PORTABLE   Final Result      Bibasilar airspace disease. Pleural effusions. CTA HEAD NECK W CONTRAST   Final Result      1. No arterial steno-occlusive disease. 2.  Bilateral pleural effusions. FL MODIFIED BARIUM SWALLOW W VIDEO   Final Result      1. Deep laryngeal penetration with thin liquid consistency by straw and cup and with nectar consistency. CT HEAD WO CONTRAST   Final Result      Cerebral atrophy. No acute intracranial findings.             XR CHEST PORTABLE   Final Result      Ill-defined airspace density in the left base, consistent with early infiltrate or atelectasis. No other acute cardiopulmonary findings. Assessment/Plan:   Pt is a 80 yo M with a PMHx of HLD, HTN , CAD s/p cardiac cath 2012 with stents, GERD, PVD who presents with fatigue and SOB.     Possible Aspiration event  Patient O2 requirements have increased while at the SNF according to the daughter, however patient is at baseline O2 since being discharged at [facility] after a PE. CXR showed left sided infilatrates as read by radiologist but unclear to myself as it looks similar to previous CXR. Does not look infectious to me or an acute issue. Issues seem more chronic. No Leukocytosis or recorded fevers.   VBG 7.41/68/30      - Procal 0.24   - ESR elevated, CRP elevated    - s/p Cefepime and Vanc ( 6/1 - 6/3)  - MRSA swab sent    - Blood cultures x2 NGTD    - IV fluids 100/h NS   - MBS: revealing laryngeal penetration    -Will remain on Unasyn after aspiration event      Ascites  Likely from portal  HTN  Elevated bili 2/2 obstruction  -CT abd pelvis revealing  B/l pleural effusions and airspace disease . Extensive ascites. Diffuse  Intra and extrabili duct dialation may indicate biliary ductal obs. Sludge in gall bladder   - diagnostic paracentesis done (6/7/22), neutrophils 1, lymphocytes 57, monocytes 29, macrophages 8, mesothelial 5. Cell cytology pending. Albumin 0.3, glucose 112, LDH 77   -s/p a dose of lasix 40 IV   -Albumin   - MRI/MRCP this am (6/8/22), revealing dilatation of extrahepatic biliary tree w CBD 18mm. No biliary calculi/mass. Associated pancreatic duct dilation. Complex sludge like fluid in GB, but GB not inflamed. Mod ascites. 4.6 cm hemangioma within lateral L hepatic lobe   - GI on board, appreciate recs      Failure to thrive   Severe calorie restriction  - Dietary consulted- Patient has not been eating or drinking recently   - SLP recs: keep pt NPO, dysphagia treatment, oral hygiene.    - MBS: revealing laryngeal penetration   - Holding PEG placement for now given ascities   - NG Tube placed yesterday, but pt pulled it out this AM    - consulted palliative care, pts POA wants full code for now   - consulted GI, appreciate recs   - continuous IVF @ 50 ml/hr     Hx COPD   Hx PE   - Eliquis BID from PE in May 2022 changed to lovenox BID         NSTEMI Type 2   -Trop elevated - will trend.  Likely demand ischemia from poor PO intake.  Echo showed 60-65% with grade 1 diastolic.       Venous stasis ulcers b/l LE   -Podiatry consulted   - wound care consulted      BPH  - Continue with flomax      HLD  -holding Lipitor 10mg      HTN   -Continue with Lopressor 12.5mg Daily ( was on 25mg but was changed on DC from [employer])   -Continue lisinopril 40mg and Hydrochlorothiazide      CAD  -Was on ASA 324mg but now hes off since hes been on Eliquis      MCV  -Folate and B12, increased   - started on thiamine         Code Status: full code  FEN: npo   PPX: lovenox held for paracentesis in am   DISPO: Joann Rios MD, PGY-1  06/08/22  8:52 AM    This patient has been staffed and discussed with Yusra Stahl MD.

## 2022-06-09 NOTE — DISCHARGE SUMMARY
will be admitted for possible HAP PNA.      The following issues were addressed during hospitalization:  Aspiration Pneumonitis   Patient O2 requirements have increased while at the SNF according to the daughter, however patient is at baseline O2 since being discharged at [facility] after a PE. CXR showed left sided infilatrates as read by radiologist but unclear to myself as it looks similar to previous CXR. Does not look infectious to me or an acute issue. Issues seem more chronic. No Leukocytosis or recorded fevers.   VBG 7.41/68/30    Procal 0.24. ESR elevated, CRP elevated. s/p Cefepime and Vanc ( 6/1 - 6/3). MRSA swab sent. Blood cultures x2 NGTD. IV fluids 100/h NS. MBS: revealing laryngeal penetration. Unasyn after aspiration event for which a rapid response was called, after pt aspurated following a feeding attempt and his O2 requirement increased.     Ascites  Likely from portal  HTN  Elevated bili 2/2 obstruction  CT abd pelvis revealing  B/l pleural effusions and airspace disease . Extensive ascites. Diffuse  Intra and extrabili duct dialation may indicate biliary ductal obs. Sludge in gall bladder   - diagnostic paracentesis done (6/7/22), neutrophils 1, lymphocytes 57, monocytes 29, macrophages 8, mesothelial 5. Cell cytology pending. Albumin 0.3, glucose 112, LDH 77. s/p a dose of lasix 40 IV. Received Albumin once   MRI/MRCP this am (6/8/22), revealing dilatation of extrahepatic biliary tree w CBD 18mm. No biliary calculi/mass. Associated pancreatic duct dilation. Complex sludge like fluid in GB, but GB not inflamed. Mod ascites. 4.6 cm hemangioma within lateral L hepatic lobe. GI was consulted      Failure to thrive   Severe calorie restriction  Dietary consulted- Patient has not been eating or drinking recently   SLP recs: keep pt NPO, dysphagia treatment, oral hygiene. MBS: revealing laryngeal penetration. Holding PEG placement for now given ascities. NG Tube placed, but pt pulled it out. Palliative care was consulted code status changed to DNR/DNI. GI was consulted.    Pt was on continuous IVF @ 50 ml/hr         Physical Exam:  Physical Exam     Consults: GI  Disposition: HOSPICE - INPATIENT   Discharged Condition: Stable  Follow Up: Primary Care Physician in one week    DISCHARGE MEDICATION:       Medication List      START taking these medications    thiamine 100 MG/ML injection  Commonly known as: B-1  Infuse 1 mL intravenously daily        CONTINUE taking these medications    acetaminophen 325 mg tablet  Commonly known as: TYLENOL     bisacodyl 10 MG suppository  Commonly known as: DULCOLAX     fish oil 1000 MG Caps     fleet 7-19 GM/118ML     ipratropium-albuterol 0.5-2.5 (3) MG/3ML Soln nebulizer solution  Commonly known as: DUONEB     magnesium hydroxide 400 MG/5ML suspension  Commonly known as: MILK OF MAGNESIA     mineral oil-hydrophilic petrolatum ointment        STOP taking these medications    apixaban 5 MG Tabs tablet  Commonly known as: ELIQUIS     carbamide peroxide 6.5 % otic solution  Commonly known as: DEBROX     lisinopril 40 MG tablet  Commonly known as: PRINIVIL;ZESTRIL     metoprolol succinate 25 MG extended release tablet  Commonly known as: TOPROL XL     multivitamin-iron-minerals-folic acid chewable tablet     simvastatin 20 MG tablet  Commonly known as: ZOCOR     tamsulosin 0.4 mg capsule  Commonly known as: FLOMAX           Where to Get Your Medications      You can get these medications from any pharmacy    Bring a paper prescription for each of these medications  · thiamine 100 MG/ML injection          Activity: activity as tolerated  Diet: NPO  Wound Care: none needed    Time Spent on discharge is more than 30 minutes    Signed:  Pedro Nguyen MD,  MD, PGY-1  6/9/2022

## 2022-06-10 LAB
BODY FLUID CULTURE, STERILE: NORMAL
GRAM STAIN RESULT: NORMAL